# Patient Record
Sex: MALE | Race: BLACK OR AFRICAN AMERICAN | Employment: PART TIME | URBAN - METROPOLITAN AREA
[De-identification: names, ages, dates, MRNs, and addresses within clinical notes are randomized per-mention and may not be internally consistent; named-entity substitution may affect disease eponyms.]

---

## 2017-02-26 RX ORDER — TAMSULOSIN HYDROCHLORIDE 0.4 MG/1
CAPSULE ORAL
Qty: 30 CAP | Refills: 5 | Status: SHIPPED | COMMUNITY
Start: 2017-02-26 | End: 2017-09-06 | Stop reason: SDUPTHER

## 2017-05-02 ENCOUNTER — OFFICE VISIT (OUTPATIENT)
Dept: FAMILY MEDICINE CLINIC | Age: 62
End: 2017-05-02

## 2017-05-02 VITALS
HEIGHT: 71 IN | TEMPERATURE: 95.8 F | HEART RATE: 82 BPM | BODY MASS INDEX: 27.92 KG/M2 | RESPIRATION RATE: 16 BRPM | SYSTOLIC BLOOD PRESSURE: 130 MMHG | WEIGHT: 199.4 LBS | OXYGEN SATURATION: 99 % | DIASTOLIC BLOOD PRESSURE: 87 MMHG

## 2017-05-02 DIAGNOSIS — G89.29 CHRONIC BILATERAL LOW BACK PAIN, WITH SCIATICA PRESENCE UNSPECIFIED: Primary | ICD-10-CM

## 2017-05-02 DIAGNOSIS — M54.5 CHRONIC BILATERAL LOW BACK PAIN, WITH SCIATICA PRESENCE UNSPECIFIED: Primary | ICD-10-CM

## 2017-05-02 DIAGNOSIS — M19.019 SHOULDER ARTHRITIS: ICD-10-CM

## 2017-05-02 DIAGNOSIS — I10 ESSENTIAL HYPERTENSION, BENIGN: ICD-10-CM

## 2017-05-02 RX ORDER — MELOXICAM 15 MG/1
TABLET ORAL
Qty: 30 TAB | Refills: 1 | Status: SHIPPED | OUTPATIENT
Start: 2017-05-02 | End: 2017-05-31 | Stop reason: ALTCHOICE

## 2017-05-02 RX ORDER — LANOLIN ALCOHOL/MO/W.PET/CERES
400 CREAM (GRAM) TOPICAL DAILY
Qty: 30 TAB | Refills: 4 | Status: SHIPPED | OUTPATIENT
Start: 2017-05-02 | End: 2017-09-01 | Stop reason: ALTCHOICE

## 2017-05-02 RX ORDER — HYDROCHLOROTHIAZIDE 25 MG/1
TABLET ORAL
Qty: 90 TAB | Refills: 1 | Status: SHIPPED | OUTPATIENT
Start: 2017-05-02 | End: 2017-11-10 | Stop reason: SDUPTHER

## 2017-05-02 NOTE — PROGRESS NOTES
Chief Complaint   Patient presents with    Hypertension     6 month follow-up   Discuss leg and arm pain  Room 4

## 2017-05-02 NOTE — PROGRESS NOTES
Subjective:     Chief Complaint   Patient presents with    Hypertension     6 month follow-up        He  is a 63 y.o. male who presents for evaluation of:    Shoulder pain - R shoulder pain mostly better. +  Night pain. No further weakness or numbess. L shoulder pain seemed to improve. 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 Pain is much better with Mobic but still having a lot of pain at night. Heat and massage help a little bit. Did not go to PT. Steroid injection helped slightly. X rays confirm OA. Low Back Pain  Symptoms have been present for months at different times. Initial inciting event: none. Symptoms are worst: nighttime. Exacerbating factors are recumbency. Alleviating factors are moving. Treatments so far initiated by patient: none - not taking the Mobic as rec previously. 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 Tingling in RLE worse in am.  No RLS sx. Has some OA in knees. No red flag symptoms including saddle anesthesia, incontinence, weakness, numbness, or tingling.     ROS: per HPI     Objective:     Vitals:    05/02/17 1534   BP: 130/87   Pulse: 82   Resp: 16   Temp: 95.8 °F (35.4 °C)   TempSrc: Oral   SpO2: 99%   Weight: 199 lb 6.4 oz (90.4 kg)   Height: 5' 11\" (1.803 m)     Physical Examination:  General appearance - alert, well appearing, and in no distress  Eyes -sclera anicteric  Neck - supple, no significant adenopathy, no thyromegaly, no bruits  Chest - clear to auscultation, no wheezes, rales or rhonchi, symmetric air entry  Heart - normal rate, regular rhythm, normal S1, S2, no murmurs, rubs, clicks or gallops  Neurological - alert, oriented, no focal findings or movement disorder noted  Msk - Bilat shoulders with FROM, Neg Neers and Flowers, Neg impingement, Neg liftoff, Neg apley scratch test  Back - FROM, no ttp, nml strength and sensation  Extr - no edema    Past Medical History:   Diagnosis Date    FH: hypertension     Hypertension      Past Surgical History:   Procedure Laterality Date    HX ORTHOPAEDIC      ganglion cyst of left wrist    [place] COLONOSCOPY FLX DX W/COLLJ SPEC WHEN PFRMD  8/29/2011          Current Outpatient Prescriptions on File Prior to Visit   Medication Sig Dispense Refill    tamsulosin (FLOMAX) 0.4 mg capsule TAKE ONE CAPSULE BY MOUTH ONE TIME DAILY 30 Cap 5    multivitamin (ONE A DAY) tablet Take 1 Tab by mouth daily. No current facility-administered medications on file prior to visit. Assessment/ Plan:   Tianna Painter was seen today for hypertension. Diagnoses and all orders for this visit:    Chronic bilateral low back pain, with sciatica presence unspecified  -     meloxicam (MOBIC) 15 mg tablet; TAKE 1 TABLET BY MOUTH ONCE DAILY IN AM FOR 3 DAYS, THEN USE DAILY AS NEEDED    Shoulder arthritis  -     meloxicam (MOBIC) 15 mg tablet; TAKE 1 TABLET BY MOUTH ONCE DAILY IN AM FOR 3 DAYS, THEN USE DAILY AS NEEDED    Essential hypertension, benign  -     hydroCHLOROthiazide (HYDRODIURIL) 25 mg tablet; TAKE ONE TABLET BY MOUTH ONE TIME DAILY    Other orders  -     magnesium oxide (MAG-OX) 400 mg tablet; Take 1 Tab by mouth daily. I have discussed the diagnosis with the patient and the intended plan as seen in the above orders. The patient has received an after-visit summary and questions were answered concerning future plans. I have discussed medication side effects and warnings with the patient as well. The patient verbalizes understanding and agreement with the plan.     Follow-up Disposition:  Return in about 5 months (around 10/2/2017), or if symptoms worsen or fail to improve, for annual exam.

## 2017-05-02 NOTE — MR AVS SNAPSHOT
Visit Information Date & Time Provider Department Dept. Phone Encounter #  
 5/2/2017  3:20 PM Mitch Ken MD [facility] at [address] [id number] Follow-up Instructions Return in about 5 months (around 10/2/2017), or if symptoms worsen or fail to improve, for annual exam. Upcoming Health Maintenance Date Due Hepatitis C Screening 1955 INFLUENZA AGE 9 TO ADULT 8/1/2017 COLONOSCOPY 8/29/2021 DTaP/Tdap/Td series (2 - Td) 5/17/2026 Allergies as of 5/2/2017  Review Complete On: 5/2/2017 By: Mitch Ken MD  
 No Known Allergies Current Immunizations  Never Reviewed No immunizations on file. Not reviewed this visit You Were Diagnosed With   
  
 Codes Comments Chronic bilateral low back pain, with sciatica presence unspecified    -  Primary ICD-10-CM: M54.5, G89.29 ICD-9-CM: 724.2, 338.29 Shoulder arthritis     ICD-10-CM: M12.9 ICD-9-CM: 716.91 Essential hypertension, benign     ICD-10-CM: I10 
ICD-9-CM: 401.1 Vitals BP Pulse Temp Resp Height(growth percentile) Weight(growth percentile) 130/87 (BP 1 Location: Left arm, BP Patient Position: Sitting) 82 95.8 °F (35.4 °C) (Oral) 16 5' 11\" (1.803 m) 199 lb 6.4 oz (90.4 kg) SpO2 BMI Smoking Status 99% 27.81 kg/m2 Never Smoker BMI and BSA Data Body Mass Index Body Surface Area  
 27.81 kg/m 2 2.13 m 2 Preferred Pharmacy Pharmacy Name Phone [facility] [place] [address] [phone] Your Updated Medication List  
  
   
This list is accurate as of: 5/2/17  4:36 PM.  Always use your most recent med list.  
  
  
  
  
 hydroCHLOROthiazide 25 mg tablet Commonly known as:  HYDRODIURIL  
TAKE ONE TABLET BY MOUTH ONE TIME DAILY  
  
 magnesium oxide 400 mg tablet Commonly known as:  MAG-OX Take 1 Tab by mouth daily. meloxicam 15 mg tablet Commonly known as:  MOBIC  
 TAKE 1 TABLET BY MOUTH ONCE DAILY IN AM FOR 3 DAYS, THEN USE DAILY AS NEEDED  
  
 multivitamin tablet Commonly known as:  ONE A DAY Take 1 Tab by mouth daily. tamsulosin 0.4 mg capsule Commonly known as:  FLOMAX TAKE ONE CAPSULE BY MOUTH ONE TIME DAILY Prescriptions Sent to Pharmacy Refills  
 hydroCHLOROthiazide (HYDRODIURIL) 25 mg tablet 1 Sig: TAKE ONE TABLET BY MOUTH ONE TIME DAILY Class: Normal  
 Pharmacy: [facility] [id number] [place] [address] Ph #: [phone]  
 meloxicam (MOBIC) 15 mg tablet 1 Sig: TAKE 1 TABLET BY MOUTH ONCE DAILY IN AM FOR 3 DAYS, THEN USE DAILY AS NEEDED Class: Normal  
 Pharmacy: [facility] [id number] [place] [address] Ph #: [phone]  
 magnesium oxide (MAG-OX) 400 mg tablet 4 Sig: Take 1 Tab by mouth daily. Class: Normal  
 Pharmacy: [facility] [address] Ph #: [phone] Route: Oral  
  
Follow-up Instructions Return in about 5 months (around 10/2/2017), or if symptoms worsen or fail to improve, for annual exam.  
  
  
Introducing [facility] & HEALTH SERVICES! [employer] introduces [employer]Shoe patient portal. Now you can access parts of your medical record, email your doctor's office, and request medication refills online. 1. In your internet browser, go to [email or web address] 2. Click on the First Time User? Click Here link in the Sign In box. You will see the New Member Sign Up page. 3. Enter your TalkShoe Access Code exactly as it appears below. You will not need to use this code after youve completed the sign-up process. If you do not sign up before the expiration date, you must request a new code. · TalkShoe Access Code: [email or web address] Expires: 7/31/2017  4:36 PM 
 
4. Enter the last four digits of your Social Security Number (xxxx) and Date of Birth (mm/dd/yyyy) as indicated and click Submit.  You will be taken to the next sign-up page. 5. Create a madKast ID. This will be your madKast login ID and cannot be changed, so think of one that is secure and easy to remember. 6. Create a madKast password. You can change your password at any time. 7. Enter your Password Reset Question and Answer. This can be used at a later time if you forget your password. 8. Enter your e-mail address. You will receive e-mail notification when new information is available in [address] 9. Click Sign Up. You can now view and download portions of your medical record. 10. Click the Download Summary menu link to download a portable copy of your medical information. If you have questions, please visit the Frequently Asked Questions section of the madKast website. Remember, madKast is NOT to be used for urgent needs. For medical emergencies, dial 911. Now available from your iPhone and Android! Please provide this summary of care documentation to your next provider. Your primary care clinician is listed as Ligia Jackson. If you have any questions after today's visit, please call [phone].

## 2017-05-22 ENCOUNTER — OFFICE VISIT (OUTPATIENT)
Dept: FAMILY MEDICINE CLINIC | Age: 62
End: 2017-05-22

## 2017-05-22 ENCOUNTER — HOSPITAL ENCOUNTER (OUTPATIENT)
Dept: GENERAL RADIOLOGY | Age: 62
Discharge: HOME | End: 2017-05-22
Payer: COMMERCIAL

## 2017-05-22 VITALS
DIASTOLIC BLOOD PRESSURE: 72 MMHG | HEART RATE: 87 BPM | OXYGEN SATURATION: 100 % | WEIGHT: 199 LBS | RESPIRATION RATE: 18 BRPM | TEMPERATURE: 97.5 F | HEIGHT: 71 IN | SYSTOLIC BLOOD PRESSURE: 148 MMHG | BODY MASS INDEX: 27.86 KG/M2

## 2017-05-22 DIAGNOSIS — M54.32 SCIATICA OF LEFT SIDE: ICD-10-CM

## 2017-05-22 DIAGNOSIS — M48.061 LUMBAR SPINAL STENOSIS: ICD-10-CM

## 2017-05-22 DIAGNOSIS — M54.16 LUMBAR RADICULOPATHY: ICD-10-CM

## 2017-05-22 DIAGNOSIS — M54.32 SCIATICA OF LEFT SIDE: Primary | ICD-10-CM

## 2017-05-22 PROCEDURE — 72100 X-RAY EXAM L-S SPINE 2/3 VWS: CPT

## 2017-05-22 RX ORDER — HYDROCODONE BITARTRATE AND ACETAMINOPHEN 5; 325 MG/1; MG/1
1 TABLET ORAL
Qty: 40 TAB | Refills: 0 | Status: SHIPPED | OUTPATIENT
Start: 2017-05-22 | End: 2017-06-12 | Stop reason: ALTCHOICE

## 2017-05-22 RX ORDER — METHYLPREDNISOLONE ACETATE 80 MG/ML
80 INJECTION, SUSPENSION INTRA-ARTICULAR; INTRALESIONAL; INTRAMUSCULAR; SOFT TISSUE ONCE
Qty: 1 VIAL | Refills: 0
Start: 2017-05-22 | End: 2017-05-22

## 2017-05-22 RX ORDER — PREDNISONE 20 MG/1
TABLET ORAL
Qty: 18 TAB | Refills: 0 | Status: SHIPPED | OUTPATIENT
Start: 2017-05-22 | End: 2017-05-31

## 2017-05-22 NOTE — MR AVS SNAPSHOT
Visit Information Date & Time Provider Department Dept. Phone Encounter #  
 5/22/2017 10:10 AM Mitch Ken MD [facility] at [address] [id number] Follow-up Instructions Return in about 4 weeks (around 6/19/2017), or if symptoms worsen or fail to improve. Your Appointments 10/2/2017  3:00 PM  
COMPLETE PHYSICAL with Mitch Ken MD  
[facility] at [address]) Appt Note: annual exam  
 [facility] [address] [id number]  
[facility] Upcoming Health Maintenance Date Due Hepatitis C Screening 1955 INFLUENZA AGE 9 TO ADULT 8/1/2017 COLONOSCOPY 8/29/2021 DTaP/Tdap/Td series (2 - Td) 5/17/2026 Allergies as of 5/22/2017  Review Complete On: 5/22/2017 By: Baudilio Mendoza LPN No Known Allergies Current Immunizations  Never Reviewed No immunizations on file. Not reviewed this visit You Were Diagnosed With   
  
 Codes Comments Sciatica of left side    -  Primary ICD-10-CM: M54.32 
ICD-9-CM: 724.3 Lumbar spinal stenosis     ICD-10-CM: M48.06 
ICD-9-CM: 724.02 Lumbar radiculopathy     ICD-10-CM: M54.16 
ICD-9-CM: 724.4 Vitals BP Pulse Temp Resp Height(growth percentile) Weight(growth percentile) 148/72 (BP 1 Location: Left arm, BP Patient Position: Sitting) 87 97.5 °F (36.4 °C) (Oral) 18 5' 11\" (1.803 m) 199 lb (90.3 kg) SpO2 BMI Smoking Status 100% 27.75 kg/m2 Never Smoker Vitals History BMI and BSA Data Body Mass Index Body Surface Area  
 27.75 kg/m 2 2.13 m 2 Preferred Pharmacy Pharmacy Name Phone [facility] [address] [place] [address] [phone] Your Updated Medication List  
  
   
This list is accurate as of: 5/22/17 10:48 AM.  Always use your most recent med list.  
  
  
  
  
 hydroCHLOROthiazide 25 mg tablet Commonly known as:  HYDRODIURIL  
TAKE ONE TABLET BY MOUTH ONE TIME DAILY HYDROcodone-acetaminophen 5-325 mg per tablet Commonly known as:  Angel Breeze Take 1 Tab by mouth every four (4) hours as needed for Pain. Max Daily Amount: 6 Tabs.  
  
 magnesium oxide 400 mg tablet Commonly known as:  MAG-OX Take 1 Tab by mouth daily. meloxicam 15 mg tablet Commonly known as:  MOBIC  
TAKE 1 TABLET BY MOUTH ONCE DAILY IN AM FOR 3 DAYS, THEN USE DAILY AS NEEDED  
  
 methylPREDNISolone acetate 80 mg/mL injection Commonly known as:  DEPO-MEDROL 1 mL by IntraMUSCular route once for 1 dose. multivitamin tablet Commonly known as:  ONE A DAY Take 1 Tab by mouth daily. predniSONE 20 mg tablet Commonly known as:  Maris Mahajanwell Take 3 pills for 3 days, then 2 pills for 3 days, then 1 pill for 3 days  
  
 tamsulosin 0.4 mg capsule Commonly known as:  FLOMAX TAKE ONE CAPSULE BY MOUTH ONE TIME DAILY Prescriptions Printed Refills HYDROcodone-acetaminophen (NORCO) 5-325 mg per tablet 0 Sig: Take 1 Tab by mouth every four (4) hours as needed for Pain. Max Daily Amount: 6 Tabs. Class: Print Route: Oral  
  
Prescriptions Sent to Pharmacy Refills  
 predniSONE (DELTASONE) 20 mg tablet 0 Sig: Take 3 pills for 3 days, then 2 pills for 3 days, then 1 pill for 3 days Class: Normal  
 Pharmacy: [address] Ph #: [phone] We Performed the Following METHYLPREDNISOLONE ACETATE INJECTION 80 MG [ [facility]] ID THER/PROPH/DIAG INJECTION, SUBCUT/IM [id number] CPT(R)] Follow-up Instructions Return in about 4 weeks (around 6/19/2017), or if symptoms worsen or fail to improve. To-Do List   
 05/22/2017 Imaging:  XR SPINE LUMB 2 OR 3 V Introducing [facility] & HEALTH SERVICES!    
 [employer] introduces [employer] patient portal. Now you can access parts of your medical record, email your doctor's office, and request medication refills online. 1. In your internet browser, go to [email or web address] 2. Click on the First Time User? Click Here link in the Sign In box. You will see the New Member Sign Up page. 3. Enter your [employer] Access Code exactly as it appears below. You will not need to use this code after youve completed the sign-up process. If you do not sign up before the expiration date, you must request a new code. · [employer] Access Code: [email or web address] Expires: 7/31/2017  4:36 PM 
 
4. Enter the last four digits of your Social Security Number (xxxx) and Date of Birth (mm/dd/yyyy) as indicated and click Submit. You will be taken to the next sign-up page. 5. Create a [employer] ID. This will be your [employer] login ID and cannot be changed, so think of one that is secure and easy to remember. 6. Create a [employer] password. You can change your password at any time. 7. Enter your Password Reset Question and Answer. This can be used at a later time if you forget your password. 8. Enter your e-mail address. You will receive e-mail notification when new information is available in [address] 9. Click Sign Up. You can now view and download portions of your medical record. 10. Click the Download Summary menu link to download a portable copy of your medical information. If you have questions, please visit the Frequently Asked Questions section of the [employer] website. Remember, [employer] is NOT to be used for urgent needs. For medical emergencies, dial 911. Now available from your iPhone and Android! Please provide this summary of care documentation to your next provider. Your primary care clinician is listed as Ankit Parker. If you have any questions after today's visit, please call [phone].

## 2017-05-22 NOTE — PROGRESS NOTES
Subjective:     Chief Complaint   Patient presents with    Hip Pain     Right        He  is a 63 y.o. male who presents for evaluation of:    Low Back Pain  He has been having severe LLE pain with shooting pains x 5 days but this has been a chronic issue for the past year. He was close to going to ER but decided to come in to see me instead. Symptoms have been present for > 12 months with numerous flares. Initial inciting event: none. Symptoms are worst: all day. Exacerbating factors are recumbency and extending back. Alleviating factors are leaning forward and tried to move around to get comfortable. Treatments so far initiated by patient: none - not taking the Mobic as rec previously. 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 Tingling in LLE worse in am.  No RLS sx. Has some OA in knees. No red flag symptoms including saddle anesthesia, incontinence, weakness.     ROS: per HPI     Objective:     Vitals:    05/22/17 0953   BP: 148/72   Pulse: 87   Resp: 18   Temp: 97.5 °F (36.4 °C)   TempSrc: Oral   SpO2: 100%   Weight: 199 lb (90.3 kg)   Height: 5' 11\" (1.803 m)     Physical Examination:  General appearance - alert, well appearing, and in no distress  Eyes -sclera anicteric  Chest - clear to auscultation, no wheezes, rales or rhonchi, symmetric air entry  Heart - normal rate, regular rhythm, normal S1, S2, no murmurs, rubs, clicks or gallops  Back - FROM, no ttp, nml strength and sensation, nml patellar reflexes bilat, neg SLR bilat  Extr - no edema    Past Medical History:   Diagnosis Date    FH: hypertension     Hypertension      Past Surgical History:   Procedure Laterality Date    HX ORTHOPAEDIC      ganglion cyst of left wrist    [place] COLONOSCOPY FLX DX W/COLLJ SPEC WHEN PFRMD  8/29/2011          Current Outpatient Prescriptions on File Prior to Visit   Medication Sig Dispense Refill    hydroCHLOROthiazide (HYDRODIURIL) 25 mg tablet TAKE ONE TABLET BY MOUTH ONE TIME DAILY 90 Tab 1    meloxicam (MOBIC) 15 mg tablet TAKE 1 TABLET BY MOUTH ONCE DAILY IN AM FOR 3 DAYS, THEN USE DAILY AS NEEDED (Patient taking differently: USE DAILY AS NEEDED) 30 Tab 1    magnesium oxide (MAG-OX) 400 mg tablet Take 1 Tab by mouth daily. 30 Tab 4    tamsulosin (FLOMAX) 0.4 mg capsule TAKE ONE CAPSULE BY MOUTH ONE TIME DAILY 30 Cap 5    multivitamin (ONE A DAY) tablet Take 1 Tab by mouth daily. No current facility-administered medications on file prior to visit. Assessment/ Plan:   Daksha Hamilton was seen today for hip pain. Diagnoses and all orders for this visit:    Sciatica of left side  Lumbar spinal stenosis  Lumbar radiculopathy  - Hx concerning for sp stenosis vs. Sciatica. Will get x-rays, do steroid injection and pills, and cover with PRN pain meds. -     METHYLPREDNISOLONE ACETATE INJECTION 80 MG  -     [place] THER/PROPH/DIAG INJECTION, SUBCUT/IM  -     methylPREDNISolone acetate (DEPO-MEDROL) 80 mg/mL injection; 1 mL by IntraMUSCular route once for 1 dose. -     XR SPINE LUMB 2 OR 3 V; Future  -     HYDROcodone-acetaminophen (NORCO) 5-325 mg per tablet; Take 1 Tab by mouth every four (4) hours as needed for Pain. Max Daily Amount: 6 Tabs. -     predniSONE (DELTASONE) 20 mg tablet; Take 3 pills for 3 days, then 2 pills for 3 days, then 1 pill for 3 days    I have discussed the diagnosis with the patient and the intended plan as seen in the above orders. The patient has received an after-visit summary and questions were answered concerning future plans. I have discussed medication side effects and warnings with the patient as well. The patient verbalizes understanding and agreement with the plan. Follow-up Disposition:  Return in about 4 weeks (around 6/19/2017), or if symptoms worsen or fail to improve.

## 2017-05-22 NOTE — PROGRESS NOTES
Chief Complaint   Patient presents with    Hip Pain     Right   Radiating down to toes since Thursday  Room 5

## 2017-05-30 ENCOUNTER — TELEPHONE (OUTPATIENT)
Dept: FAMILY MEDICINE CLINIC | Age: 62
End: 2017-05-30

## 2017-05-30 DIAGNOSIS — G89.29 CHRONIC MIDLINE LOW BACK PAIN WITH BILATERAL SCIATICA: Primary | ICD-10-CM

## 2017-05-30 DIAGNOSIS — M54.42 CHRONIC MIDLINE LOW BACK PAIN WITH BILATERAL SCIATICA: Primary | ICD-10-CM

## 2017-05-30 DIAGNOSIS — M54.41 CHRONIC MIDLINE LOW BACK PAIN WITH BILATERAL SCIATICA: Primary | ICD-10-CM

## 2017-05-30 NOTE — LETTER
NOTIFICATION RETURN TO WORK / SCHOOL 
 
6/2/2017 3:28 PM 
 
Mr. Silvia Lindo [address] [place] [id number] To Whom It May Concern: 
 
Silvia Lindo is [facility] the [facility]. He will return to work/school on: 6/9/17 due to a medical condition. If there are questions or concerns please have the patient contact our office.  
 
 
 
Sincerely, 
 
 
Ludivina Saxena MD

## 2017-05-30 NOTE — TELEPHONE ENCOUNTER
Patient states that pain has not improved and unable to work due to job requiring him to stand for long periods of time.

## 2017-05-30 NOTE — TELEPHONE ENCOUNTER
Pt complains of continuing back/leg pain   Medication prescribed from last visit is not helping   Aleve helped a little   He has not been able to go to work   Pls call to advise       Best number to reach him is [phone]

## 2017-06-02 ENCOUNTER — TELEPHONE (OUTPATIENT)
Dept: FAMILY MEDICINE CLINIC | Age: 62
End: 2017-06-02

## 2017-06-02 ENCOUNTER — DOCUMENTATION ONLY (OUTPATIENT)
Dept: FAMILY MEDICINE CLINIC | Age: 62
End: 2017-06-02

## 2017-06-02 NOTE — TELEPHONE ENCOUNTER
Pt wife calling for Kindra García       Re: MRI - still havent heard anything about scheduling       Best number to reach her is [phone]

## 2017-06-06 ENCOUNTER — TELEPHONE (OUTPATIENT)
Dept: FAMILY MEDICINE CLINIC | Age: 62
End: 2017-06-06

## 2017-06-06 NOTE — TELEPHONE ENCOUNTER
Pls call pt wife, PSR cannot understand what she is saying   \"Maybe about his work letter\"  She said he didn't miss his apptmnt here yesterday, Ji Rachel said he didn't have to come.        Best number to reach her is [phone]

## 2017-06-08 ENCOUNTER — HOSPITAL ENCOUNTER (OUTPATIENT)
Dept: MRI IMAGING | Age: 62
Discharge: HOME | End: 2017-06-08
Attending: FAMILY MEDICINE
Payer: COMMERCIAL

## 2017-06-08 DIAGNOSIS — M54.41 CHRONIC MIDLINE LOW BACK PAIN WITH BILATERAL SCIATICA: ICD-10-CM

## 2017-06-08 DIAGNOSIS — M54.42 CHRONIC MIDLINE LOW BACK PAIN WITH BILATERAL SCIATICA: ICD-10-CM

## 2017-06-08 DIAGNOSIS — G89.29 CHRONIC MIDLINE LOW BACK PAIN WITH BILATERAL SCIATICA: ICD-10-CM

## 2017-06-08 PROCEDURE — 72148 MRI LUMBAR SPINE W/O DYE: CPT

## 2017-06-12 ENCOUNTER — OFFICE VISIT (OUTPATIENT)
Dept: FAMILY MEDICINE CLINIC | Age: 62
End: 2017-06-12

## 2017-06-12 VITALS
SYSTOLIC BLOOD PRESSURE: 145 MMHG | HEIGHT: 71 IN | OXYGEN SATURATION: 100 % | TEMPERATURE: 98.7 F | RESPIRATION RATE: 16 BRPM | BODY MASS INDEX: 27.47 KG/M2 | HEART RATE: 77 BPM | WEIGHT: 196.2 LBS | DIASTOLIC BLOOD PRESSURE: 87 MMHG

## 2017-06-12 DIAGNOSIS — M48.07 LUMBOSACRAL SPINAL STENOSIS: Primary | ICD-10-CM

## 2017-06-12 RX ORDER — IBUPROFEN 800 MG/1
800 TABLET ORAL EVERY 8 HOURS
Qty: 60 TAB | Refills: 0
Start: 2017-06-12 | End: 2019-01-30 | Stop reason: ALTCHOICE

## 2017-06-12 RX ORDER — OXYCODONE AND ACETAMINOPHEN 5; 325 MG/1; MG/1
1 TABLET ORAL
Qty: 42 TAB | Refills: 0 | Status: SHIPPED | OUTPATIENT
Start: 2017-06-12 | End: 2017-09-01 | Stop reason: ALTCHOICE

## 2017-06-12 NOTE — MR AVS SNAPSHOT
Visit Information Date & Time Provider Department Dept. Phone Encounter #  
 6/12/2017  2:00 PM Anh Nowak MD [facility] at [address] [id number] Follow-up Instructions Return in about 4 weeks (around 7/10/2017), or if symptoms worsen or fail to improve. Your Appointments 10/2/2017  3:00 PM  
COMPLETE PHYSICAL with Anh Nowak MD  
[facility] at [facility] [address]) Appt Note: annual exam  
 [facility] [address] [address]  
[facility] Upcoming Health Maintenance Date Due Hepatitis C Screening 1955 INFLUENZA AGE 9 TO ADULT 8/1/2017 COLONOSCOPY 8/29/2021 DTaP/Tdap/Td series (2 - Td) 5/17/2026 Allergies as of 6/12/2017  Review Complete On: 6/12/2017 By: Anh Nowak MD  
 No Known Allergies Current Immunizations  Never Reviewed No immunizations on file. Not reviewed this visit You Were Diagnosed With   
  
 Codes Comments Lumbosacral spinal stenosis    -  Primary ICD-10-CM: M48.07 
ICD-9-CM: 724.02 Vitals BP Pulse Temp Resp Height(growth percentile) Weight(growth percentile) 145/87 (BP 1 Location: Left arm, BP Patient Position: Sitting) 77 98.7 °F (37.1 °C) (Oral) 16 5' 11\" (1.803 m) 196 lb 3.2 oz (89 kg) SpO2 BMI Smoking Status 100% 27.36 kg/m2 Never Smoker Vitals History BMI and BSA Data Body Mass Index Body Surface Area  
 27.36 kg/m 2 2.11 m 2 Preferred Pharmacy Pharmacy Name Phone [facility] [address] [place] [address] [phone] Your Updated Medication List  
  
   
This list is accurate as of: 6/12/17  3:10 PM.  Always use your most recent med list.  
  
  
  
  
 hydroCHLOROthiazide 25 mg tablet Commonly known as:  HYDRODIURIL  
TAKE ONE TABLET BY MOUTH ONE TIME DAILY ibuprofen 800 mg tablet Commonly known as:  MOTRIN Take 1 Tab by mouth every eight (8) hours. Take with food  
  
 magnesium oxide 400 mg tablet Commonly known as:  MAG-OX Take 1 Tab by mouth daily. multivitamin tablet Commonly known as:  ONE A DAY Take 1 Tab by mouth daily. oxyCODONE-acetaminophen 5-325 mg per tablet Commonly known as:  PERCOCET Take 1 Tab by mouth every four (4) hours as needed for Pain. Max Daily Amount: 6 Tabs. tamsulosin 0.4 mg capsule Commonly known as:  FLOMAX TAKE ONE CAPSULE BY MOUTH ONE TIME DAILY Prescriptions Printed Refills  
 oxyCODONE-acetaminophen (PERCOCET) 5-325 mg per tablet 0 Sig: Take 1 Tab by mouth every four (4) hours as needed for Pain. Max Daily Amount: 6 Tabs. Class: Print Route: Oral  
  
We Performed the Following REFERRAL TO ORTHOPEDIC SURGERY [REF62 Custom] Comments:  
 Please evaluate patient for: spinal stenosis, lumbago Follow-up Instructions Return in about 4 weeks (around 7/10/2017), or if symptoms worsen or fail to improve. Referral Information Referral ID Referred By Referred To  
  
 [id number] Gabriela GAMBOA [address] Phone: [phone] Visits Status Start Date End Date 1 New Request 6/12/17 6/12/18 If your referral has a status of pending review or denied, additional information will be sent to support the outcome of this decision. Introducing [facility]! Deisi Fiore introduces [employer] patient portal. Now you can access parts of your medical record, email your doctor's office, and request medication refills online. 1. In your internet browser, go to [email or web address] 2. Click on the First Time User? Click Here link in the Sign In box. You will see the New Member Sign Up page. 3. Enter your [employer] Access Code exactly as it appears below. You will not need to use this code after youve completed the sign-up process. If you do not sign up before the expiration date, you must request a new code. · [employer] Access Code: [email or web address] Expires: 7/31/2017  4:36 PM 
 
4. Enter the last four digits of your Social Security Number (xxxx) and Date of Birth (mm/dd/yyyy) as indicated and click Submit. You will be taken to the next sign-up page. 5. Create a [employer] ID. This will be your [employer] login ID and cannot be changed, so think of one that is secure and easy to remember. 6. Create a [employer] password. You can change your password at any time. 7. Enter your Password Reset Question and Answer. This can be used at a later time if you forget your password. 8. Enter your e-mail address. You will receive e-mail notification when new information is available in [address] 9. Click Sign Up. You can now view and download portions of your medical record. 10. Click the Download Summary menu link to download a portable copy of your medical information. If you have questions, please visit the Frequently Asked Questions section of the [employer] website. Remember, [employer] is NOT to be used for urgent needs. For medical emergencies, dial 911. Now available from your iPhone and Android! Please provide this summary of care documentation to your next provider. Your primary care clinician is listed as Trent Cazares. If you have any questions after today's visit, please call [phone].

## 2017-06-12 NOTE — PROGRESS NOTES
Chief Complaint   Patient presents with    Follow-up     MRI results   Pain worse   Unable to work or stand for long periods of time  Room 4

## 2017-06-12 NOTE — PROGRESS NOTES
Subjective:     Chief Complaint   Patient presents with    Follow-up     MRI results      He  is a 63 y.o. male who presents for evaluation of:    Low Back Pain  He has been having severe LLE pain with shooting pains x 2 weeks but this has been a chronic issue for the past year. Symptoms have been present for > 12 months with numerous flares. Initial inciting event: none. Symptoms are worst: all day. Exacerbating factors are recumbency and extending back. Alleviating factors are leaning forward and tried to move around to get comfortable. Treatments so far: Mobic, Prednisone, DepoMedrol injection, heat, stretching. 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 Pain and tingling in LLE seems to be getting worse. No saddle anesthesia and no incontinence. ROS: per HPI     Objective:     Vitals:    06/12/17 1405   BP: 145/87   Pulse: 77   Resp: 16   Temp: 98.7 °F (37.1 °C)   TempSrc: Oral   SpO2: 100%   Weight: 196 lb 3.2 oz (89 kg)   Height: 5' 11\" (1.803 m)     Physical Examination:  General appearance - alert, well appearing, and in no distress  Eyes -sclera anicteric  Chest - clear to auscultation, no wheezes, rales or rhonchi, symmetric air entry  Heart - normal rate, regular rhythm, normal S1, S2, no murmurs, rubs, clicks or gallops  Back - FROM but pain with extension, no ttp, nml strength, decreased vibratory sensation in bilat LE, nml patellar reflexes bilat  Extr - no edema    Past Medical History:   Diagnosis Date    FH: hypertension     Hypertension      Past Surgical History:   Procedure Laterality Date    HX ORTHOPAEDIC      ganglion cyst of left wrist    [place] COLONOSCOPY FLX DX W/COLLJ SPEC WHEN PFRMD  8/29/2011          Current Outpatient Prescriptions on File Prior to Visit   Medication Sig Dispense Refill    hydroCHLOROthiazide (HYDRODIURIL) 25 mg tablet TAKE ONE TABLET BY MOUTH ONE TIME DAILY 90 Tab 1    magnesium oxide (MAG-OX) 400 mg tablet Take 1 Tab by mouth daily. 30 Tab 4    tamsulosin (FLOMAX) 0.4 mg capsule TAKE ONE CAPSULE BY MOUTH ONE TIME DAILY 30 Cap 5    multivitamin (ONE A DAY) tablet Take 1 Tab by mouth daily. No current facility-administered medications on file prior to visit. Assessment/ Plan:   Marlo Lund was seen today for follow-up. Diagnoses and all orders for this visit:    Lumbosacral spinal stenosis - sending to Ortho. Concern with MRI read of possibility of instability. No improvement with steroids. Will use Percocet and ibuprofen to help with pain control. Holding on PT given MRI read. -     oxyCODONE-acetaminophen (PERCOCET) 5-325 mg per tablet; Take 1 Tab by mouth every four (4) hours as needed for Pain. Max Daily Amount: 6 Tabs. -     ibuprofen (MOTRIN) 800 mg tablet; Take 1 Tab by mouth every eight (8) hours. Take with food  -     REFERRAL TO ORTHOPEDIC SURGERY    I have discussed the diagnosis with the patient and the intended plan as seen in the above orders. The patient has received an after-visit summary and questions were answered concerning future plans. I have discussed medication side effects and warnings with the patient as well. The patient verbalizes understanding and agreement with the plan. Follow-up Disposition:  Return in about 4 weeks (around 7/10/2017), or if symptoms worsen or fail to improve.

## 2017-06-12 NOTE — LETTER
NOTIFICATION RETURN TO WORK / SCHOOL 
 
6/12/2017 3:06 PM 
 
Mr. Nohemy Humphries [address] [place] [id number] To Whom It May Concern: 
 
Nohemy Humphries [facility] He will return to work/school on: 6/26/17. He will be out due to medical illness. If there are questions or concerns please have the patient contact our office.  
 
 
 
Sincerely, 
 
 
Kaycee Villeda MD

## 2017-06-13 ENCOUNTER — TELEPHONE (OUTPATIENT)
Dept: FAMILY MEDICINE CLINIC | Age: 62
End: 2017-06-13

## 2017-06-13 NOTE — TELEPHONE ENCOUNTER
Pt wife would like a call     Re: referral for [facility]   They need all [employer] doctors     This apptmnt is out of network for them       Best number to reach wife is C [phone]

## 2017-06-14 NOTE — TELEPHONE ENCOUNTER
Patient's wife advised that [address] physician and  She states she checked with office and everything was approved.

## 2017-06-16 ENCOUNTER — DOCUMENTATION ONLY (OUTPATIENT)
Dept: FAMILY MEDICINE CLINIC | Age: 62
End: 2017-06-16

## 2017-08-03 ENCOUNTER — TELEPHONE (OUTPATIENT)
Dept: FAMILY MEDICINE CLINIC | Age: 62
End: 2017-08-03

## 2017-08-03 NOTE — TELEPHONE ENCOUNTER
----- Message from Sofya Schulz sent at 8/3/2017  9:35 AM EDT -----  Regarding: Dr. Rosina Pgua  Pt request to have referral faxed to Dr. Andrade Resendiz at [phone] for an appt on 08/15/17 3:00PM.  Best contact number to reach Dr. Yocasta Reese is [phone]. Best contact number to reach pt is [phone].

## 2017-08-17 ENCOUNTER — TELEPHONE (OUTPATIENT)
Dept: FAMILY MEDICINE CLINIC | Age: 62
End: 2017-08-17

## 2017-08-17 NOTE — TELEPHONE ENCOUNTER
----- Message from [facility] sent at 8/17/2017  7:45 AM EDT -----  Regarding: Dr. Kamar Paulson ##urgent referral req##  Pt needs a referral to go to [address] [place] [address]. His appt is [email or web address].    [place] [address] [address] [place] [id number] [phone]    The website did not list a fax# for the [address] [place] [address] location.

## 2017-08-25 ENCOUNTER — TELEPHONE (OUTPATIENT)
Dept: FAMILY MEDICINE CLINIC | Age: 62
End: 2017-08-25

## 2017-08-25 NOTE — TELEPHONE ENCOUNTER
Pt wife asking for a referral for cataract surgery     Scheduled for 9/25/17  [facility]   Dr. Aleena Saucedo number to reach her is [phone]  She wants a call to know that you received this as she stated she left a message yesterday for Rudolph Cardona and there is nothing showing in [address] [place] [address]

## 2017-08-30 ENCOUNTER — TELEPHONE (OUTPATIENT)
Dept: FAMILY MEDICINE CLINIC | Age: 62
End: 2017-08-30

## 2017-08-30 NOTE — TELEPHONE ENCOUNTER
Pt needs a return to work letter per wife   He would like to return on 9-1-17       Best number to reach her is [phone]

## 2017-08-31 ENCOUNTER — OFFICE VISIT (OUTPATIENT)
Dept: FAMILY MEDICINE CLINIC | Age: 62
End: 2017-08-31

## 2017-08-31 VITALS
OXYGEN SATURATION: 99 % | SYSTOLIC BLOOD PRESSURE: 143 MMHG | WEIGHT: 194 LBS | RESPIRATION RATE: 16 BRPM | BODY MASS INDEX: 27.16 KG/M2 | TEMPERATURE: 98.5 F | DIASTOLIC BLOOD PRESSURE: 89 MMHG | HEIGHT: 71 IN | HEART RATE: 91 BPM

## 2017-08-31 DIAGNOSIS — M48.061 FORAMINAL STENOSIS OF LUMBAR REGION: Primary | ICD-10-CM

## 2017-08-31 DIAGNOSIS — M54.32 LEFT SIDED SCIATICA: ICD-10-CM

## 2017-08-31 RX ORDER — LATANOPROST 50 UG/ML
SOLUTION/ DROPS OPHTHALMIC
Refills: 6 | COMMUNITY
Start: 2017-08-16

## 2017-08-31 NOTE — PROGRESS NOTES
Chief Complaint   Patient presents with    Follow-up     Left Hip Pain   Needs note to return to work  Room 8

## 2017-08-31 NOTE — LETTER
NOTIFICATION RETURN TO WORK / SCHOOL 
 
8/31/2017 10:48 AM 
 
Mr. Emmett Trejo [address] [place] [id number] To Whom It May Concern: 
 
Emmett Trejo is currently under the care of Mirella [facility]. He will return to work/school on: 8/31/17 with no restrictions If there are questions or concerns please have the patient contact our office.  
 
 
 
Sincerely, 
 
 
Melissa Joseph MD

## 2017-08-31 NOTE — MR AVS SNAPSHOT
Visit Information Date & Time Provider Department Dept. Phone Encounter #  
 8/31/2017 10:30 AM Fara Christianson MD [facility] at [address] [id number] Follow-up Instructions Return in about 3 months (around 11/30/2017), or if symptoms worsen or fail to improve. Your Appointments 10/2/2017  3:00 PM  
COMPLETE PHYSICAL with Fara Christianson MD  
[facility] at [facility] Appt Note: annual exam  
 [facility] [address] [address]  
[facility] Upcoming Health Maintenance Date Due Hepatitis C Screening 1955 INFLUENZA AGE 9 TO ADULT 8/1/2017 COLONOSCOPY 8/29/2021 DTaP/Tdap/Td series (2 - Td) 5/17/2026 Allergies as of 8/31/2017  Review Complete On: 8/31/2017 By: Fara Christianson MD  
 No Known Allergies Current Immunizations  Never Reviewed No immunizations on file. Not reviewed this visit You Were Diagnosed With   
  
 Codes Comments Foraminal stenosis of lumbar region    -  Primary ICD-10-CM: M99.83 ICD-9-CM: 724.02 Left sided sciatica     ICD-10-CM: M54.32 
ICD-9-CM: 724.3 Vitals BP Pulse Temp Resp Height(growth percentile) Weight(growth percentile) 143/89 (BP 1 Location: Left arm, BP Patient Position: Sitting) 91 98.5 °F (36.9 °C) (Oral) 16 5' 11\" (1.803 m) 194 lb (88 kg) SpO2 BMI Smoking Status 99% 27.06 kg/m2 Never Smoker BMI and BSA Data Body Mass Index Body Surface Area  
 27.06 kg/m 2 2.1 m 2 Preferred Pharmacy Pharmacy Name Phone [facility] [address] [place] [address] [phone] Your Updated Medication List  
  
   
This list is accurate as of: 8/31/17 10:51 AM.  Always use your most recent med list.  
  
  
  
  
 hydroCHLOROthiazide 25 mg tablet Commonly known as:  HYDRODIURIL  
 TAKE ONE TABLET BY MOUTH ONE TIME DAILY  
  
 ibuprofen 800 mg tablet Commonly known as:  MOTRIN Take 1 Tab by mouth every eight (8) hours. Take with food  
  
 latanoprost 0.005 % ophthalmic solution Commonly known as:  XALATAN  
INSTIL 1 DROP INTO BOTH EYES NIGHTLY AT BEDTIME  
  
 magnesium oxide 400 mg tablet Commonly known as:  MAG-OX Take 1 Tab by mouth daily. multivitamin tablet Commonly known as:  ONE A DAY Take 1 Tab by mouth daily. oxyCODONE-acetaminophen 5-325 mg per tablet Commonly known as:  PERCOCET Take 1 Tab by mouth every four (4) hours as needed for Pain. Max Daily Amount: 6 Tabs. tamsulosin 0.4 mg capsule Commonly known as:  FLOMAX TAKE ONE CAPSULE BY MOUTH ONE TIME DAILY Follow-up Instructions Return in about 3 months (around 11/30/2017), or if symptoms worsen or fail to improve. Introducing \[facility]\"" & [facility] SERVICES! James De León introduces [employer] patient portal. Now you can access parts of your medical record, email your doctor's office, and request medication refills online. 1. In your internet browser, go to [email or web address] 2. Click on the First Time User? Click Here link in the Sign In box. You will see the New Member Sign Up page. 3. Enter your [employer] Access Code exactly as it appears below. You will not need to use this code after youve completed the sign-up process. If you do not sign up before the expiration date, you must request a new code. · [employer] Access Code: [email or web address] Expires: 11/29/2017 10:45 AM 
 
4. Enter the last four digits of your Social Security Number (xxxx) and Date of Birth (mm/dd/yyyy) as indicated and click Submit. You will be taken to the next sign-up page. 5. Create a Minervaxt ID. This will be your [employer] login ID and cannot be changed, so think of one that is secure and easy to remember. 6. Create a [employer] password. You can change your password at any time. 7. Enter your Password Reset Question and Answer. This can be used at a later time if you forget your password. 8. Enter your e-mail address. You will receive e-mail notification when new information is available in [address] 9. Click Sign Up. You can now view and download portions of your medical record. 10. Click the Download Summary menu link to download a portable copy of your medical information. If you have questions, please visit the Frequently Asked Questions section of the [employer] website. Remember, [employer] is NOT to be used for urgent needs. For medical emergencies, dial 911. Now available from your iPhone and Android! Please provide this summary of care documentation to your next provider. Your primary care clinician is listed as Selma Burgos. If you have any questions after today's visit, please call [phone].

## 2017-08-31 NOTE — PROGRESS NOTES
Subjective:     Chief Complaint   Patient presents with    Follow-up     Left Hip Pain      He  is a 63 y.o. male who presents for evaluation of:    Foraminal stenosis and left-sided sciatica  seen by Dr. Mainor Terry and ended up seeing Dr. Saritha Nielsen to be able to do the procedure more quickly. Patient has been doing very well since having MAGGI with Dr. Saritha Nielsen. Feels much better and would like to go back to work. No further concerns today but needs a letter for work. ROS: per HPI     Objective:     Vitals:    08/31/17 1042   BP: 143/89   Pulse: 91   Resp: 16   Temp: 98.5 °F (36.9 °C)   TempSrc: Oral   SpO2: 99%   Weight: 194 lb (88 kg)   Height: 5' 11\" (1.803 m)     Physical Examination:  General appearance - alert, well appearing, and in no distress  Eyes -sclera anicteric  Chest - clear to auscultation, no wheezes, rales or rhonchi, symmetric air entry  Heart - normal rate, regular rhythm, normal S1, S2, no murmurs, rubs, clicks or gallops  Extr - no edema    Past Medical History:   Diagnosis Date    FH: hypertension     Hypertension      Past Surgical History:   Procedure Laterality Date    HX ORTHOPAEDIC      ganglion cyst of left wrist    [place] COLONOSCOPY FLX DX W/COLLJ SPEC WHEN PFRMD  8/29/2011          Current Outpatient Prescriptions on File Prior to Visit   Medication Sig Dispense Refill    ibuprofen (MOTRIN) 800 mg tablet Take 1 Tab by mouth every eight (8) hours. Take with food 60 Tab 0    hydroCHLOROthiazide (HYDRODIURIL) 25 mg tablet TAKE ONE TABLET BY MOUTH ONE TIME DAILY 90 Tab 1    tamsulosin (FLOMAX) 0.4 mg capsule TAKE ONE CAPSULE BY MOUTH ONE TIME DAILY 30 Cap 5    multivitamin (ONE A DAY) tablet Take 1 Tab by mouth daily. No current facility-administered medications on file prior to visit. Assessment/ Plan:   Diagnoses and all orders for this visit:    1. Foraminal stenosis of lumbar region  2. Left sided sciatica  - Much better after MAGGI.   Gave patient a letter to return to work.    I have discussed the diagnosis with the patient and the intended plan as seen in the above orders. The patient has received an after-visit summary and questions were answered concerning future plans. I have discussed medication side effects and warnings with the patient as well. The patient verbalizes understanding and agreement with the plan. Follow-up Disposition:  Return in about 3 months (around 11/30/2017), or if symptoms worsen or fail to improve.

## 2017-09-06 RX ORDER — TAMSULOSIN HYDROCHLORIDE 0.4 MG/1
CAPSULE ORAL
Qty: 30 CAP | Refills: 11 | Status: SHIPPED | OUTPATIENT
Start: 2017-09-06 | End: 2017-11-08 | Stop reason: SDUPTHER

## 2017-10-02 ENCOUNTER — OFFICE VISIT (OUTPATIENT)
Dept: FAMILY MEDICINE CLINIC | Age: 62
End: 2017-10-02

## 2017-10-02 VITALS
BODY MASS INDEX: 26.88 KG/M2 | HEART RATE: 86 BPM | SYSTOLIC BLOOD PRESSURE: 116 MMHG | WEIGHT: 192 LBS | OXYGEN SATURATION: 99 % | TEMPERATURE: 98.1 F | RESPIRATION RATE: 18 BRPM | DIASTOLIC BLOOD PRESSURE: 75 MMHG | HEIGHT: 71 IN

## 2017-10-02 DIAGNOSIS — M48.061 FORAMINAL STENOSIS OF LUMBAR REGION: ICD-10-CM

## 2017-10-02 DIAGNOSIS — Z92.241 S/P EPIDURAL STEROID INJECTION: ICD-10-CM

## 2017-10-02 DIAGNOSIS — R73.03 PREDIABETES: ICD-10-CM

## 2017-10-02 DIAGNOSIS — M19.011 PRIMARY OSTEOARTHRITIS OF RIGHT SHOULDER: ICD-10-CM

## 2017-10-02 DIAGNOSIS — Z11.59 SCREENING FOR VIRAL DISEASE: ICD-10-CM

## 2017-10-02 DIAGNOSIS — Z00.00 WELL ADULT EXAM: Primary | ICD-10-CM

## 2017-10-02 DIAGNOSIS — R35.1 BENIGN PROSTATIC HYPERPLASIA WITH NOCTURIA: ICD-10-CM

## 2017-10-02 DIAGNOSIS — N40.1 BENIGN PROSTATIC HYPERPLASIA WITH NOCTURIA: ICD-10-CM

## 2017-10-02 DIAGNOSIS — I10 ESSENTIAL HYPERTENSION, BENIGN: ICD-10-CM

## 2017-10-02 DIAGNOSIS — H25.9 AGE-RELATED CATARACT OF BOTH EYES, UNSPECIFIED AGE-RELATED CATARACT TYPE: ICD-10-CM

## 2017-10-02 LAB
BILIRUB UR QL STRIP: NEGATIVE
GLUCOSE UR-MCNC: NEGATIVE MG/DL
KETONES P FAST UR STRIP-MCNC: NEGATIVE MG/DL
PH UR STRIP: 6.5 [PH] (ref 4.6–8)
PROT UR QL STRIP: NEGATIVE MG/DL
SP GR UR STRIP: 1.01 (ref 1–1.03)
UA UROBILINOGEN AMB POC: NORMAL (ref 0.2–1)
URINALYSIS CLARITY POC: CLEAR
URINALYSIS COLOR POC: YELLOW
URINE BLOOD POC: NORMAL
URINE LEUKOCYTES POC: NEGATIVE
URINE NITRITES POC: NEGATIVE

## 2017-10-02 RX ORDER — LIDOCAINE HYDROCHLORIDE 10 MG/ML
1 INJECTION INFILTRATION; PERINEURAL ONCE
Qty: 1 VIAL | Refills: 0
Start: 2017-10-02 | End: 2017-10-02

## 2017-10-02 RX ORDER — METHYLPREDNISOLONE ACETATE 40 MG/ML
40 INJECTION, SUSPENSION INTRA-ARTICULAR; INTRALESIONAL; INTRAMUSCULAR; SOFT TISSUE ONCE
Qty: 1 ML | Refills: 0
Start: 2017-10-02 | End: 2017-10-02

## 2017-10-02 NOTE — PROGRESS NOTES
Subjective:     Chief Complaint   Patient presents with    Complete Physical     Annual      He  is a 63 y.o. male who presents for evaluation of:  He had a colonoscopy 2011 and was nml so will go back in 10 yrs. He is up to date on vaccines. Sees dentist regularly. Recently started seeing Dr. Mervat Vargas and found to have bilat cataracts. 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 Tingling in RLE worse in am.  No RLS sx. Has some OA in knees. Avoids pork. Hyperlipidemia & HTN  Pt is doing well on current meds with no medication side effects noted  No new myalgias, no joint pains, no weakness  No TIA's, no chest pain on exertion, no dyspnea on exertion, no swelling of ankles. Exercising - Walks daily at work. Dieting - Does a good job with diet betty with salt avoidance. Smoking - No     Lab Results   Component Value Date/Time    Cholesterol, total 150 09/26/2016 02:51 PM    HDL Cholesterol 75 09/26/2016 02:51 PM    LDL, calculated 63 09/26/2016 02:51 PM    Triglyceride 62 09/26/2016 02:51 PM     Shoulder pain - R shoulder pain better with . Night pain. No further weakness or numbess. L shoulder pain seemed to improve. 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 Pain is much better with Mobic but still having a lot of pain at night. Heat and massage help a little bit. Did not go to PT. Interested in steroid injection today. X rays confirm OA.     ROS:  Constitutional: negative for fevers, chills and fatigue  Eyes: negative for visual disturbance  Ears, nose, mouth, throat, and face: negative for hearing loss and sore throat  Respiratory: negative for cough or dyspnea on exertion  Cardiovascular: negative for chest pain, dyspnea, palpitations, fatigue  Gastrointestinal: negative for nausea, vomiting, change in bowel habits, diarrhea and abdominal pain  Genitourinary: + BPH, sx mild and better with flomax, wakes up to urinate 0-1 x per night, some weak stream sx  Integument/breast: negative for rash and skin lesion(s)  Hematologic/lymphatic: negative for easy bruising and bleeding  Musculoskeletal: per HPI  Neurological: negative for headaches and dizziness  Behavioral/Psych: negative for anxiety and depression     Objective:     Vitals:    10/02/17 1508   BP: 116/75   Pulse: 86   Resp: 18   Temp: 98.1 °F (36.7 °C)   TempSrc: Oral   SpO2: 99%   Weight: 192 lb (87.1 kg)   Height: 5' 11\" (1.803 m)     Physical Examination:  General appearance - alert, well appearing, and in no distress  Eyes - sclera anicteric, bilat cataracts  Nose - normal and patent, no erythema, discharge or polyps  Mouth - mucous membranes moist, pharynx normal without lesions  Neck - supple, no significant adenopathy  Lymphatics - no palpable lymphadenopathy, no hepatosplenomegaly  Chest - clear to auscultation, no wheezes, rales or rhonchi, symmetric air entry  Heart - normal rate, regular rhythm, normal S1, S2, no murmurs, rubs, clicks or gallops  Abdomen - soft, nontender, nondistended, no masses or organomegaly   - declined today  Neurological - alert, oriented, normal speech, no focal findings or movement disorder noted  Musculoskeletal - no joint tenderness, deformity or swelling  Extremities - no edema  Skin - no rashes or suspicious lesions    Procedure:  After verbal consent was obtained, risks and benefits of the procedure were discussed with the patient and alternatives discussed. Cleansed with alcohol pads. Injected mix of 50/50 1% lidocaine 1 ml and Depomedrol 40 mg into R shoulder.  The procedure was well tolerated with no complications      Past Medical History:   Diagnosis Date    FH: hypertension     Hypertension      Past Surgical History:   Procedure Laterality Date    HX ORTHOPAEDIC      ganglion cyst of left wrist    MD COLONOSCOPY FLX DX W/COLLJ SPEC WHEN PFRMD  8/29/2011          Current Outpatient Prescriptions on File Prior to Visit   Medication Sig Dispense Refill    tamsulosin (FLOMAX) 0.4 mg capsule TAKE ONE CAPSULE BY MOUTH ONE TIME DAILY 30 Cap 11    latanoprost (XALATAN) 0.005 % ophthalmic solution INSTIL 1 DROP INTO BOTH EYES NIGHTLY AT BEDTIME  6    ibuprofen (MOTRIN) 800 mg tablet Take 1 Tab by mouth every eight (8) hours. Take with food 60 Tab 0    hydroCHLOROthiazide (HYDRODIURIL) 25 mg tablet TAKE ONE TABLET BY MOUTH ONE TIME DAILY 90 Tab 1    multivitamin (ONE A DAY) tablet Take 1 Tab by mouth daily. No current facility-administered medications on file prior to visit. Assessment/ Plan:   Diagnoses and all orders for this visit:    1. Well adult exam  -     CBC W/O DIFF  -     HEMOGLOBIN A1C WITH EAG  -     LIPID PANEL  -     METABOLIC PANEL, COMPREHENSIVE  -     TSH 3RD GENERATION  -     AMB POC URINALYSIS DIP STICK AUTO W/O MICRO    2. Essential hypertension, benign - well controlled  -     METABOLIC PANEL, COMPREHENSIVE  -     AMB POC URINALYSIS DIP STICK AUTO W/O MICRO    3. Foraminal stenosis of lumbar region  4. S/P epidural steroid injection  - Improved    5. Primary osteoarthritis of right shoulder - injected today  -     methylPREDNISolone acetate (DEPO-MEDROL) 40 mg/mL injection; 1 mL by IntraMUSCular route once for 1 dose. -     (DEPO-MEDROL 40 mg  -   quantity 1 for Reimbursement) METHYLPREDNISOLONE ACETATE 40 mg injection  -     lidocaine (XYLOCAINE) 10 mg/mL (1 %) injection; 1 mL by IntraDERMal route once for 1 dose. -     20610 - DRAIN/INJECT LARGE JOINT/BURSA    6. Prediabetes  -     HEMOGLOBIN A1C WITH EAG    7. Screening for viral disease  -     HEPATITIS C AB    8. Benign prostatic hyperplasia with nocturia - mild sx, checking PSA  -     PROSTATE SPECIFIC AG    9. Age-related cataract of both eyes, unspecified age-related cataract type - ready for surgery soon    I have discussed the diagnosis with the patient and the intended plan as seen in the above orders.   The patient has received an after-visit summary and questions were answered concerning future plans. I have discussed medication side effects and warnings with the patient as well. The patient verbalizes understanding and agreement with the plan. Follow-up Disposition:  Return in about 6 months (around 4/2/2018), or if symptoms worsen or fail to improve.

## 2017-10-02 NOTE — MR AVS SNAPSHOT
Visit Information Date & Time Provider Department Dept. Phone Encounter #  
 10/2/2017  3:00 PM Megan Strange MD [facility] at [address] [id number] Follow-up Instructions Return in about 6 months (around 4/2/2018), or if symptoms worsen or fail to improve. Upcoming Health Maintenance Date Due Hepatitis C Screening 1955 COLONOSCOPY 8/29/2021 DTaP/Tdap/Td series (2 - Td) 5/17/2026 Allergies as of 10/2/2017  Review Complete On: 10/2/2017 By: Megan Strange MD  
 No Known Allergies Current Immunizations  Never Reviewed No immunizations on file. Not reviewed this visit You Were Diagnosed With   
  
 Codes Comments Well adult exam    -  Primary ICD-10-CM: Z00.00 ICD-9-CM: V70.0 Essential hypertension, benign     ICD-10-CM: I10 
ICD-9-CM: 401.1 Foraminal stenosis of lumbar region     ICD-10-CM: M99.83 ICD-9-CM: 724.02 S/P epidural steroid injection     ICD-10-CM: T26.277 ICD-9-CM: V45.89 Primary osteoarthritis of right shoulder     ICD-10-CM: M19.011 
ICD-9-CM: 715.11 Prediabetes     ICD-10-CM: R73.03 
ICD-9-CM: 790.29 Screening for viral disease     ICD-10-CM: Z11.59 
ICD-9-CM: V73.99 Benign prostatic hyperplasia with nocturia     ICD-10-CM: N40.1, R35.1 ICD-9-CM: 600.01, 788.43 Age-related cataract of both eyes, unspecified age-related cataract type     ICD-10-CM: H25.9 ICD-9-CM: 366.10 Vitals BP Pulse Temp Resp Height(growth percentile) Weight(growth percentile) 116/75 (BP 1 Location: Left arm, BP Patient Position: Sitting) 86 98.1 °F (36.7 °C) (Oral) 18 5' 11\" (1.803 m) 192 lb (87.1 kg) SpO2 BMI Smoking Status 99% 26.78 kg/m2 Never Smoker Vitals History BMI and BSA Data Body Mass Index Body Surface Area  
 26.78 kg/m 2 2.09 m 2 Preferred Pharmacy Pharmacy Name Phone [id number] [place] [address] [phone] Your Updated Medication List  
  
   
This list is accurate as of: 10/2/17  4:33 PM.  Always use your most recent med list.  
  
  
  
  
 hydroCHLOROthiazide 25 mg tablet Commonly known as:  HYDRODIURIL  
TAKE ONE TABLET BY MOUTH ONE TIME DAILY  
  
 ibuprofen 800 mg tablet Commonly known as:  MOTRIN Take 1 Tab by mouth every eight (8) hours. Take with food  
  
 latanoprost 0.005 % ophthalmic solution Commonly known as:  XALATAN  
INSTIL 1 DROP INTO BOTH EYES NIGHTLY AT BEDTIME  
  
 multivitamin tablet Commonly known as:  ONE A DAY Take 1 Tab by mouth daily. tamsulosin 0.4 mg capsule Commonly known as:  FLOMAX TAKE ONE CAPSULE BY MOUTH ONE TIME DAILY We Performed the Following AMB POC URINALYSIS DIP STICK AUTO W/O MICRO [31115 CPT(R)] CBC W/O DIFF [13604 CPT(R)] HEMOGLOBIN A1C WITH EAG [40406 CPT(R)] HEPATITIS C AB [60268 CPT(R)] LIPID PANEL [67093 CPT(R)] METABOLIC PANEL, COMPREHENSIVE [74681 CPT(R)] PSA, DIAGNOSTIC (PROSTATE SPECIFIC AG) [id number] CPT(R)] TSH 3RD GENERATION [77097 CPT(R)] Follow-up Instructions Return in about 6 months (around 4/2/2018), or if symptoms worsen or fail to improve. Introducing [facility] & HEALTH SERVICES! [facility] introduces [employer] patient portal. Now you can access parts of your medical record, email your doctor's office, and request medication refills online. 1. In your internet browser, go to [email or web address] 2. Click on the First Time User? Click Here link in the Sign In box. You will see the New Member Sign Up page. 3. Enter your [employer] Access Code exactly as it appears below. You will not need to use this code after youve completed the sign-up process. If you do not sign up before the expiration date, you must request a new code.  
 
· [employer] Access Code: [email or web address] 
 Expires: 11/29/2017 10:45 AM 
 
4. Enter the last four digits of your Social Security Number (xxxx) and Date of Birth (mm/dd/yyyy) as indicated and click Submit. You will be taken to the next sign-up page. 5. Create a [employer]ego ID. This will be your Tego login ID and cannot be changed, so think of one that is secure and easy to remember. 6. Create a Tego password. You can change your password at any time. 7. Enter your Password Reset Question and Answer. This can be used at a later time if you forget your password. 8. Enter your e-mail address. You will receive e-mail notification when new information is available in [address] 9. Click Sign Up. You can now view and download portions of your medical record. 10. Click the Download Summary menu link to download a portable copy of your medical information. If you have questions, please visit the Frequently Asked Questions section of the Tego website. Remember, [employer] is NOT to be used for urgent needs. For medical emergencies, dial 911. Now available from your iPhone and Android! Please provide this summary of care documentation to your next provider. Your primary care clinician is listed as Noreen Colmenares. If you have any questions after today's visit, please call [phone].

## 2017-10-05 LAB
ALBUMIN SERPL-MCNC: 4.7 G/DL (ref 3.6–4.8)
ALBUMIN/GLOB SERPL: 1.7 {RATIO} (ref 1.2–2.2)
ALP SERPL-CCNC: 63 IU/L (ref 39–117)
ALT SERPL-CCNC: 11 IU/L (ref 0–44)
AST SERPL-CCNC: 15 IU/L (ref 0–40)
BILIRUB SERPL-MCNC: 0.7 MG/DL (ref 0–1.2)
BUN SERPL-MCNC: 15 MG/DL (ref 8–27)
BUN/CREAT SERPL: 15 (ref 10–24)
CALCIUM SERPL-MCNC: 10.1 MG/DL (ref 8.6–10.2)
CHLORIDE SERPL-SCNC: 98 MMOL/L (ref 96–106)
CHOLEST SERPL-MCNC: 155 MG/DL (ref 100–199)
CO2 SERPL-SCNC: 28 MMOL/L (ref 18–29)
CREAT SERPL-MCNC: 1.01 MG/DL (ref 0.76–1.27)
ERYTHROCYTE [DISTWIDTH] IN BLOOD BY AUTOMATED COUNT: 14.2 % (ref 12.3–15.4)
EST. AVERAGE GLUCOSE BLD GHB EST-MCNC: 105 MG/DL
GLOBULIN SER CALC-MCNC: 2.8 G/DL (ref 1.5–4.5)
GLUCOSE SERPL-MCNC: 90 MG/DL (ref 65–99)
HBA1C MFR BLD: 5.3 % (ref 4.8–5.6)
HCT VFR BLD AUTO: 43.6 % (ref 37.5–51)
HCV AB S/CO SERPL IA: <0.1 S/CO RATIO (ref 0–0.9)
HDLC SERPL-MCNC: 78 MG/DL
HGB BLD-MCNC: 14.6 G/DL (ref 12.6–17.7)
LDLC SERPL CALC-MCNC: 62 MG/DL (ref 0–99)
MCH RBC QN AUTO: 31.2 PG (ref 26.6–33)
MCHC RBC AUTO-ENTMCNC: 33.5 G/DL (ref 31.5–35.7)
MCV RBC AUTO: 93 FL (ref 79–97)
PLATELET # BLD AUTO: 168 X10E3/UL (ref 150–379)
POTASSIUM SERPL-SCNC: 4.3 MMOL/L (ref 3.5–5.2)
PROT SERPL-MCNC: 7.5 G/DL (ref 6–8.5)
PSA SERPL-MCNC: 2.8 NG/ML (ref 0–4)
RBC # BLD AUTO: 4.68 X10E6/UL (ref 4.14–5.8)
SODIUM SERPL-SCNC: 140 MMOL/L (ref 134–144)
TRIGL SERPL-MCNC: 73 MG/DL (ref 0–149)
TSH SERPL DL<=0.005 MIU/L-ACNC: 1.79 UIU/ML (ref 0.45–4.5)
VLDLC SERPL CALC-MCNC: 15 MG/DL (ref 5–40)
WBC # BLD AUTO: 5.3 X10E3/UL (ref 3.4–10.8)

## 2017-10-23 ENCOUNTER — TELEPHONE (OUTPATIENT)
Dept: FAMILY MEDICINE CLINIC | Age: 62
End: 2017-10-23

## 2017-10-23 NOTE — TELEPHONE ENCOUNTER
Pt having cataract surgery on 11/13/17 per wife     She states he was just in and has had everything done, only needs a form filled out     Pls call to advise     [phone]

## 2017-11-07 ENCOUNTER — OFFICE VISIT (OUTPATIENT)
Dept: FAMILY MEDICINE CLINIC | Age: 62
End: 2017-11-07

## 2017-11-07 VITALS
WEIGHT: 190 LBS | HEART RATE: 77 BPM | DIASTOLIC BLOOD PRESSURE: 82 MMHG | TEMPERATURE: 97.8 F | OXYGEN SATURATION: 99 % | BODY MASS INDEX: 26.6 KG/M2 | RESPIRATION RATE: 16 BRPM | HEIGHT: 71 IN | SYSTOLIC BLOOD PRESSURE: 120 MMHG

## 2017-11-07 DIAGNOSIS — Z01.818 PREOP EXAMINATION: Primary | ICD-10-CM

## 2017-11-07 DIAGNOSIS — H25.9 AGE-RELATED CATARACT OF BOTH EYES, UNSPECIFIED AGE-RELATED CATARACT TYPE: ICD-10-CM

## 2017-11-07 RX ORDER — PREDNISOLONE ACETATE 10 MG/ML
SUSPENSION/ DROPS OPHTHALMIC
Refills: 1 | COMMUNITY
Start: 2017-10-11 | End: 2019-01-30 | Stop reason: ALTCHOICE

## 2017-11-07 RX ORDER — MOXIFLOXACIN 5 MG/ML
SOLUTION/ DROPS OPHTHALMIC
Refills: 1 | COMMUNITY
Start: 2017-10-12 | End: 2019-01-30 | Stop reason: ALTCHOICE

## 2017-11-07 RX ORDER — KETOROLAC TROMETHAMINE 5 MG/ML
SOLUTION OPHTHALMIC
Refills: 1 | COMMUNITY
Start: 2017-10-11 | End: 2019-01-30 | Stop reason: ALTCHOICE

## 2017-11-07 NOTE — MR AVS SNAPSHOT
Visit Information Date & Time Provider Department Dept. Phone Encounter #  
 11/7/2017  3:40 PM Sofia Davis MD [address] at [address] [place] [address] [id number] Follow-up Instructions Return in about 6 months (around 5/7/2018), or if symptoms worsen or fail to improve. Your Appointments 4/10/2018  3:40 PM  
ROUTINE CARE with Sofia Davis MD  
[address] at [facility] [address] [place] [address] Road) Appt Note: 6 mos f/u  
 [address] [address]  
[facility] Upcoming Health Maintenance Date Due COLONOSCOPY 8/29/2021 DTaP/Tdap/Td series (2 - Td) 5/17/2026 Allergies as of 11/7/2017  Review Complete On: 11/7/2017 By: Jessica Cazares LPN No Known Allergies Current Immunizations  Never Reviewed No immunizations on file. Not reviewed this visit You Were Diagnosed With   
  
 Codes Comments Preop examination    -  Primary ICD-10-CM: A53.020 ICD-9-CM: V72.84 Age-related cataract of both eyes, unspecified age-related cataract type     ICD-10-CM: H25.9 ICD-9-CM: 366.10 Vitals BP Pulse Temp Resp Height(growth percentile) Weight(growth percentile) 120/82 (BP 1 Location: Right arm, BP Patient Position: Sitting) 77 97.8 °F (36.6 °C) (Oral) 16 5' 11\" (1.803 m) 190 lb (86.2 kg) SpO2 BMI Smoking Status 99% 26.5 kg/m2 Never Smoker Vitals History BMI and BSA Data Body Mass Index Body Surface Area  
 26.5 kg/m 2 2.08 m 2 Preferred Pharmacy Pharmacy [facility] [place] [address] [phone] Your Updated Medication List  
  
   
This list is accurate as of: 11/7/17  4:38 PM.  Always use your most recent med list.  
  
  
  
  
 hydroCHLOROthiazide 25 mg tablet Commonly known as:  HYDRODIURIL  
 TAKE ONE TABLET BY MOUTH ONE TIME DAILY  
  
 ibuprofen 800 mg tablet Commonly known as:  MOTRIN Take 1 Tab by mouth every eight (8) hours. Take with food  
  
 ketorolac 0.5 % ophthalmic solution Commonly known as:  Yevonne Shown INSTILL 1 DROP IN RIGHT EYE FOUR TIMES A DAY  
  
 latanoprost 0.005 % ophthalmic solution Commonly known as:  XALATAN  
INSTIL 1 DROP INTO BOTH EYES NIGHTLY AT BEDTIME  
  
 moxifloxacin 0.5 % ophthalmic solution Commonly known as:  Romelle Kaylyn INSTILL 1 DROP IN RIGHT EYE FOUR TIMES A DAY  
  
 multivitamin tablet Commonly known as:  ONE A DAY Take 1 Tab by mouth daily. prednisoLONE acetate 1 % ophthalmic suspension Commonly known as:  PRED FORTE INSTILL 1 DROP IN RIGHT EYE FOUR TIMES A DAY  
  
 tamsulosin 0.4 mg capsule Commonly known as:  FLOMAX TAKE ONE CAPSULE BY MOUTH ONE TIME DAILY Follow-up Instructions Return in about 6 months (around 5/7/2018), or if symptoms worsen or fail to improve. Introducing [facility] & HEALTH SERVICES! Vielka Zafar introduces [employer] patient portal. Now you can access parts of your medical record, email your doctor's office, and request medication refills online. 1. In your internet browser, go to [email or web address] 2. Click on the First Time User? Click Here link in the Sign In box. You will see the New Member Sign Up page. 3. Enter your [employer] Access Code exactly as it appears below. You will not need to use this code after youve completed the sign-up process. If you do not sign up before the expiration date, you must request a new code. · [employer] Access Code: [email or web address] Expires: 11/29/2017  9:45 AM 
 
4. Enter the last four digits of your Social Security Number (xxxx) and Date of Birth (mm/dd/yyyy) as indicated and click Submit. You will be taken to the next sign-up page. 5. Create a [employer] ID.  This will be your [employer] login ID and cannot be changed, so think of one that is secure and easy to remember. 6. Create a [employer] password. You can change your password at any time. 7. Enter your Password Reset Question and Answer. This can be used at a later time if you forget your password. 8. Enter your e-mail address. You will receive e-mail notification when new information is available in [address] 9. Click Sign Up. You can now view and download portions of your medical record. 10. Click the Download Summary menu link to download a portable copy of your medical information. If you have questions, please visit the Frequently Asked Questions section of the [employer] website. Remember, [employer] is NOT to be used for urgent needs. For medical emergencies, dial 911. Now available from your iPhone and Android! Please provide this summary of care documentation to your next provider. Your primary care clinician is listed as Vu Cabreras. If you have any questions after today's visit, please call [phone].

## 2017-11-07 NOTE — PROGRESS NOTES
Subjective:     Chief Complaint   Patient presents with    Pre-op Exam     Surgery scheduled Cataract 11/13/17 &12/4/17        He  is a 63 y.o. male who presents for evaluation of:  Pre-op for cataract - Dr. Jamie Liang with R on 11/13/17 and L on 12/4/17. Doing well today. No allergies to latex. Easily able to do 4 METs. ROS:  Constitutional: negative except for fevers, chills and fatigue  Eyes: negative for visual disturbance  Ears, nose, mouth, throat, and face: negative for hearing loss and sore throat  Respiratory: negative for cough or dyspnea on exertion  Cardiovascular: negative for chest pain, dyspnea, palpitations, fatigue  Gastrointestinal: negative for nausea, vomiting, change in bowel habits, diarrhea and abdominal pain  Genitourinary:negative for frequency and dysuria  Integument/breast: negative for rash and skin lesion(s)  Hematologic/lymphatic: negative for easy bruising and bleeding  Musculoskeletal: doing ok with OA in R shoulder and lumbar foraminal stenosis  Neurological: negative for headaches and dizziness  Behavioral/Psych: negative for anxiety and depression    Past Medical History:   Diagnosis Date    FH: hypertension     Hypertension      Past Surgical History:   Procedure Laterality Date    HX ORTHOPAEDIC      ganglion cyst of left wrist    NM COLONOSCOPY FLX DX W/COLLJ SPEC WHEN PFRMD  8/29/2011          Current Outpatient Prescriptions on File Prior to Visit   Medication Sig Dispense Refill    tamsulosin (FLOMAX) 0.4 mg capsule TAKE ONE CAPSULE BY MOUTH ONE TIME DAILY 30 Cap 11    latanoprost (XALATAN) 0.005 % ophthalmic solution INSTIL 1 DROP INTO BOTH EYES NIGHTLY AT BEDTIME  6    ibuprofen (MOTRIN) 800 mg tablet Take 1 Tab by mouth every eight (8) hours. Take with food 60 Tab 0    hydroCHLOROthiazide (HYDRODIURIL) 25 mg tablet TAKE ONE TABLET BY MOUTH ONE TIME DAILY 90 Tab 1    multivitamin (ONE A DAY) tablet Take 1 Tab by mouth daily.        No current facility-administered medications on file prior to visit. Objective:     Vitals:    11/07/17 1558   BP: 120/82   Pulse: 77   Resp: 16   Temp: 97.8 °F (36.6 °C)   TempSrc: Oral   SpO2: 99%   Weight: 190 lb (86.2 kg)   Height: 5' 11\" (1.803 m)     Physical Examination:  General appearance - alert, well appearing, and in no distress  Eyes - sclera anicteric, bilat cataracts  Nose - normal and patent, no erythema, discharge or polyps  Mouth - mucous membranes moist, pharynx normal without lesions  Neck - supple, no significant adenopathy  Lymphatics - no palpable lymphadenopathy, no hepatosplenomegaly  Chest - clear to auscultation, no wheezes, rales or rhonchi, symmetric air entry  Heart - normal rate, regular rhythm, normal S1, S2, no murmurs, rubs, clicks or gallops  Abdomen - soft, nontender, nondistended, no masses or organomegaly  Neurological - alert, oriented, normal speech, no focal findings or movement disorder noted  Musculoskeletal - no joint tenderness, deformity or swelling  Extremities - no edema  Skin - no rashes or suspicious lesions    Assessment/ Plan:   Diagnoses and all orders for this visit:    1. Preop examination  2. Age-related cataract of both eyes, unspecified age-related cataract type  - Cleared for surgery. Is taking Flomax and has some risk for Floppy Iris Syndrome     I have discussed the diagnosis with the patient and the intended plan as seen in the above orders. The patient has received an after-visit summary and questions were answered concerning future plans. I have discussed medication side effects and warnings with the patient as well. The patient verbalizes understanding and agreement with the plan. Follow-up Disposition:  Return in about 6 months (around 5/7/2018), or if symptoms worsen or fail to improve.

## 2017-11-07 NOTE — PROGRESS NOTES
Chief Complaint   Patient presents with   Gokul Stewart Pre-op Exam     Surgery scheduled Cataract 11/13/17 &12/4/17   Surgeon Tito Lerma MD  Room 7

## 2017-11-08 RX ORDER — TAMSULOSIN HYDROCHLORIDE 0.4 MG/1
CAPSULE ORAL
Qty: 90 CAP | Refills: 1 | Status: SHIPPED | OUTPATIENT
Start: 2017-11-08 | End: 2018-05-06 | Stop reason: SDUPTHER

## 2017-11-09 ENCOUNTER — TELEPHONE (OUTPATIENT)
Dept: FAMILY MEDICINE CLINIC | Age: 62
End: 2017-11-09

## 2017-11-09 NOTE — TELEPHONE ENCOUNTER
Nilson Long. 18. fax form over from patient last visit for surgery     Best number to reach Ab[place] @ [phone]    Fax [phone]

## 2017-11-10 DIAGNOSIS — I10 ESSENTIAL HYPERTENSION, BENIGN: ICD-10-CM

## 2017-11-10 RX ORDER — HYDROCHLOROTHIAZIDE 25 MG/1
TABLET ORAL
Qty: 90 TAB | Refills: 1 | Status: SHIPPED | OUTPATIENT
Start: 2017-11-10 | End: 2018-05-06 | Stop reason: SDUPTHER

## 2017-12-26 ENCOUNTER — TELEPHONE (OUTPATIENT)
Dept: FAMILY MEDICINE CLINIC | Age: 62
End: 2017-12-26

## 2017-12-26 NOTE — TELEPHONE ENCOUNTER
Spoke with wife. They want copy of results letter mailed in Oct sent. They can't find the original. Copy mailed to patient.

## 2017-12-26 NOTE — TELEPHONE ENCOUNTER
----- Message from Mya Saucedo sent at 12/26/2017  8:12 AM EST -----  Regarding: Dr. Michael Lewis / Telephone  Patients wife Brandi Cox calling to request a copy of her husbands most recent lab work be faxed to her office at  [phone] and her best contact number is [phone].

## 2018-05-06 DIAGNOSIS — I10 ESSENTIAL HYPERTENSION, BENIGN: ICD-10-CM

## 2018-05-07 RX ORDER — TAMSULOSIN HYDROCHLORIDE 0.4 MG/1
CAPSULE ORAL
Qty: 90 CAP | Refills: 1 | Status: SHIPPED | OUTPATIENT
Start: 2018-05-07 | End: 2018-11-08 | Stop reason: SDUPTHER

## 2018-05-07 RX ORDER — HYDROCHLOROTHIAZIDE 25 MG/1
TABLET ORAL
Qty: 90 TAB | Refills: 1 | Status: SHIPPED | OUTPATIENT
Start: 2018-05-07 | End: 2018-11-08 | Stop reason: SDUPTHER

## 2019-01-24 DIAGNOSIS — I10 ESSENTIAL HYPERTENSION, BENIGN: ICD-10-CM

## 2019-01-24 RX ORDER — HYDROCHLOROTHIAZIDE 25 MG/1
TABLET ORAL
Qty: 90 TAB | Refills: 0 | Status: SHIPPED | OUTPATIENT
Start: 2019-01-24 | End: 2019-05-07 | Stop reason: SDUPTHER

## 2019-01-30 ENCOUNTER — OFFICE VISIT (OUTPATIENT)
Dept: FAMILY MEDICINE CLINIC | Age: 64
End: 2019-01-30

## 2019-01-30 VITALS
HEIGHT: 71 IN | TEMPERATURE: 98.5 F | WEIGHT: 213.4 LBS | DIASTOLIC BLOOD PRESSURE: 86 MMHG | HEART RATE: 88 BPM | OXYGEN SATURATION: 99 % | RESPIRATION RATE: 18 BRPM | BODY MASS INDEX: 29.88 KG/M2 | SYSTOLIC BLOOD PRESSURE: 136 MMHG

## 2019-01-30 DIAGNOSIS — Z00.00 WELL ADULT EXAM: Primary | ICD-10-CM

## 2019-01-30 DIAGNOSIS — I10 ESSENTIAL HYPERTENSION, BENIGN: ICD-10-CM

## 2019-01-30 DIAGNOSIS — R29.818 SUSPECTED SLEEP APNEA: ICD-10-CM

## 2019-01-30 DIAGNOSIS — N40.1 BENIGN PROSTATIC HYPERPLASIA WITH NOCTURIA: ICD-10-CM

## 2019-01-30 DIAGNOSIS — R73.03 PREDIABETES: ICD-10-CM

## 2019-01-30 DIAGNOSIS — Z92.241 S/P EPIDURAL STEROID INJECTION: ICD-10-CM

## 2019-01-30 DIAGNOSIS — M48.061 SPINAL STENOSIS OF LUMBAR REGION WITHOUT NEUROGENIC CLAUDICATION: ICD-10-CM

## 2019-01-30 DIAGNOSIS — R35.1 BENIGN PROSTATIC HYPERPLASIA WITH NOCTURIA: ICD-10-CM

## 2019-01-30 DIAGNOSIS — M48.061 FORAMINAL STENOSIS OF LUMBAR REGION: ICD-10-CM

## 2019-01-30 DIAGNOSIS — M19.011 PRIMARY OSTEOARTHRITIS OF RIGHT SHOULDER: ICD-10-CM

## 2019-01-30 PROBLEM — H25.819 COMBINED FORMS OF AGE-RELATED CATARACT: Status: ACTIVE | Noted: 2017-10-03

## 2019-01-30 PROBLEM — M54.32 LEFT SIDED SCIATICA: Status: ACTIVE | Noted: 2017-08-09

## 2019-01-30 PROBLEM — H40.003 GLAUCOMA SUSPECT OF BOTH EYES: Status: ACTIVE | Noted: 2017-10-03

## 2019-01-30 RX ORDER — GABAPENTIN 100 MG/1
CAPSULE ORAL
COMMUNITY
Start: 2019-01-25 | End: 2019-07-30 | Stop reason: SDUPTHER

## 2019-01-30 NOTE — PROGRESS NOTES
Subjective: Chief Complaint Patient presents with  Complete Physical  
  Annual  
  
He  is a 66 y.o. male who presents for evaluation of: CPE Since last appt, has seen Dr. Percy Durant and Dr. Yessenia Vieyra for his spinal stenosis. Getting ESIs which help. He had a colonoscopy 2011 and was nml so will go back in 10 yrs. He is up to date on vaccines. Sees dentist regularly. Hyperlipidemia & HTN Pt is doing well on current meds with no medication side effects noted No new myalgias, no joint pains, no weakness No TIA's, no chest pain on exertion, no dyspnea on exertion, no swelling of ankles. Exercising - Walks daily at work. Dieting - Does a good job with diet betty with salt avoidance. Smoking - No 
  
Lab Results Component Value Date/Time Cholesterol, total 155 10/04/2017 11:37 AM  
 HDL Cholesterol 78 10/04/2017 11:37 AM  
 LDL, calculated 62 10/04/2017 11:37 AM  
 Triglyceride 73 10/04/2017 11:37 AM  
 
ROS: 
Constitutional: negative for fevers, chills and fatigue Eyes: negative for visual disturbance Ears, nose, mouth, throat, and face: negative for hearing loss and sore throat Respiratory: negative for cough or dyspnea on exertion Cardiovascular: negative for chest pain, dyspnea, palpitations, fatigue Gastrointestinal: negative for nausea, vomiting, change in bowel habits, diarrhea and abdominal pain Genitourinary: + BPH, sx mild and better with flomax, wakes up to urinate 0-1 x per night, some weak stream sx. Integument/breast: negative for rash and skin lesion(s) Hematologic/lymphatic: negative for easy bruising and bleeding Musculoskeletal: per HPI Neurological: negative for headaches and dizziness Behavioral/Psych: negative for anxiety and depression MICHELLE risk: 1. Snoring - Do you snore loudly (louder than talking or loud enough to be heard through closed doors)? Y 
2. Tired - Do you often feel tired, fatigued, or sleepy during daytime?  Emery Sweeney 
 3. Observed - Has anyone observed you stop breathing during your sleep? Y 
4. Blood pressure - Do you have or are you being treated for high blood pressure? Y 
5. BMI - BMI more than 35 kg/m2? N 
10. Age - Age over 48 yr old? Y 
7. Neck circumference - Neck circumference greater than 40 cm? Y 
8. Gender - Gender male? Tamanna Eligio High risk of MICHELLE: >5 Mod risk of MICHELLE: 3-5 Low risk of MICHELLE: <3 
  
Objective:  
 
Vitals:  
 01/30/19 1018 BP: 136/86 Pulse: 88 Resp: 18 Temp: 98.5 °F (36.9 °C) TempSrc: Oral  
SpO2: 99% Weight: 213 lb 6.4 oz (96.8 kg) Height: 5' 11\" (1.803 m) Physical Examination: 
General appearance - alert, well appearing, and in no distress Eyes - sclera anicteric Nose - normal and patent, no erythema, discharge or polyps Mouth - mucous membranes moist, pharynx normal without lesions Neck - supple, no significant adenopathy Lymphatics - no palpable lymphadenopathy, no hepatosplenomegaly Chest - clear to auscultation, no wheezes, rales or rhonchi, symmetric air entry Heart - normal rate, regular rhythm, normal S1, S2, no murmurs, rubs, clicks or gallops Abdomen - soft, nontender, nondistended, no masses or organomegaly  - declined today Neurological - alert, oriented, normal speech, no focal findings or movement disorder noted Musculoskeletal - no joint tenderness, deformity or swelling Extremities - no edema Skin - no rashes or suspicious lesions Past Medical History:  
Diagnosis Date  FH: hypertension  Hypertension Past Surgical History:  
Procedure Laterality Date  HX ORTHOPAEDIC    
 ganglion cyst of left wrist  
 [place] COLONOSCOPY FLX DX W/COLLJ SPEC WHEN PFRMD  8/29/2011 Current Outpatient Medications on File Prior to Visit Medication Sig Dispense Refill  gabapentin (NEURONTIN) 100 mg capsule TAKE ONE CAPSULE BY MOUTH GRADUALLY INCREASING TO THREE CAPSULES NIGHTLY AS NEEDED FOR MUSCLE SPASMS  hydroCHLOROthiazide (HYDRODIURIL) 25 mg tablet TAKE ONE TABLET BY MOUTH ONE TIME DAILY 90 Tab 0  
 tamsulosin (FLOMAX) 0.4 mg capsule TAKE ONE CAPSULE BY MOUTH ONE TIME DAILY 90 Cap 0  
 latanoprost (XALATAN) 0.005 % ophthalmic solution INSTIL 1 DROP INTO BOTH EYES NIGHTLY AT BEDTIME  6  
 multivitamin (ONE A DAY) tablet Take 1 Tab by mouth daily as needed. No current facility-administered medications on file prior to visit. Assessment/ Plan:  
Diagnoses and all orders for this visit: 
 
1. Well adult exam 
 
2. Essential hypertension, benign- controlled 3. Prediabetes- stable 4. Spinal stenosis of lumbar region without neurogenic claudication 5. Foraminal stenosis of lumbar region 6. S/P epidural steroid injection - working with Ortho and PM&R, better with ESIs and not interested in sgy at this point 7. Benign prostatic hyperplasia with nocturia- stable on meds 8. Primary osteoarthritis of right shoulder - stable 9. Suspected sleep apnea- pt to check in with wife about hx and may pursue testing I have discussed the diagnosis with the patient and the intended plan as seen in the above orders. The patient has received an after-visit summary and questions were answered concerning future plans. I have discussed medication side effects and warnings with the patient as well. The patient verbalizes understanding and agreement with the plan. Follow-up Disposition: 
Return in about 6 months (around 7/30/2019).

## 2019-01-30 NOTE — PROGRESS NOTES
Chief Complaint Patient presents with  Complete Physical  
  Annual  
Was seeing Dr Rola Patel 2018 due to insurance Last seen in 9/2018

## 2019-03-05 RX ORDER — TAMSULOSIN HYDROCHLORIDE 0.4 MG/1
CAPSULE ORAL
Qty: 90 CAP | Refills: 0 | Status: SHIPPED | OUTPATIENT
Start: 2019-03-05 | End: 2019-06-04 | Stop reason: SDUPTHER

## 2019-03-20 ENCOUNTER — OFFICE VISIT (OUTPATIENT)
Dept: FAMILY MEDICINE CLINIC | Age: 64
End: 2019-03-20

## 2019-03-20 VITALS
HEIGHT: 61 IN | WEIGHT: 205 LBS | TEMPERATURE: 100.5 F | RESPIRATION RATE: 18 BRPM | OXYGEN SATURATION: 97 % | DIASTOLIC BLOOD PRESSURE: 90 MMHG | HEART RATE: 92 BPM | BODY MASS INDEX: 38.71 KG/M2 | SYSTOLIC BLOOD PRESSURE: 137 MMHG

## 2019-03-20 DIAGNOSIS — J09.X2 INFLUENZA A (H5N1): Primary | ICD-10-CM

## 2019-03-20 PROBLEM — E66.01 SEVERE OBESITY (HCC): Status: ACTIVE | Noted: 2019-03-20

## 2019-03-20 LAB
FLUAV+FLUBV AG NOSE QL IA.RAPID: NEGATIVE POS/NEG
FLUAV+FLUBV AG NOSE QL IA.RAPID: POSITIVE POS/NEG
VALID INTERNAL CONTROL?: YES

## 2019-03-20 RX ORDER — PROMETHAZINE HYDROCHLORIDE AND CODEINE PHOSPHATE 6.25; 1 MG/5ML; MG/5ML
1 SOLUTION ORAL
Qty: 60 ML | Refills: 0 | Status: SHIPPED | OUTPATIENT
Start: 2019-03-20 | End: 2019-03-23

## 2019-03-20 NOTE — PROGRESS NOTES
Subjective: Chief Complaint Patient presents with  Cold Symptoms He  is a 66y.o. year old male who presents for evaluation of URI Upper Respiratory Infection Patient complains of symptoms of a URI. Symptoms include congestion, sore throat, fever and chills, myalgias, HAs. Onset of symptoms was 4 days ago, unchanged since that time. He is drinking plenty of fluids. Evaluation to date: none. Treatment to date: none. ROS: 
Constitutional: per HPI Eyes: negative for visual disturbance Ears, nose, mouth, throat, and face: per HPI Respiratory: per HPI Cardiovascular: negative for chest pain, dyspnea, palpitations Gastrointestinal: negative for nausea, vomiting, diarrhea and abdominal pain Integument/breast: negative for rash Objective:  
 
Vitals:  
 03/20/19 1405 BP: 137/90 Pulse: 92 Resp: 18 Temp: (!) 100.5 °F (38.1 °C) TempSrc: Oral  
SpO2: 97% Weight: 205 lb (93 kg) Height: 5' 1\" (1.549 m) Physical Examination:  
Gen - NAD Eyes - sclera anicteric ENT - turbinates inflamed bilat, no sinus tenderness, post pharynx erythematous with no exudate Resp - CTAB, no w/r/r  
CV - rrr, no m/r/g No Known Allergies Social History Socioeconomic History  Marital status:  Spouse name: Not on file  Number of children: Not on file  Years of education: Not on file  Highest education level: Not on file Tobacco Use  Smoking status: Never Smoker  Smokeless tobacco: Never Used Substance and Sexual Activity  Alcohol use: Yes Alcohol/week: 2.5 oz Types: 3 Cans of beer, 2 Standard drinks or equivalent per week  Drug use: No  
 Sexual activity: Yes  
  Partners: Female Family History Problem Relation Age of Onset  Hypertension Mother  Cancer Father ? ?  
 Hypertension Brother Past Surgical History:  
Procedure Laterality Date  HX ORTHOPAEDIC    
 ganglion cyst of left wrist  
  [place] COLONOSCOPY FLX DX W/COLLJ SPEC WHEN PFRMD  8/29/2011 Past Medical History:  
Diagnosis Date  FH: hypertension  Hypertension Current Outpatient Medications Medication Sig Dispense Refill  promethazine-codeine (PHENERGAN WITH CODEINE) 6.25-10 mg/5 mL syrup Take 5 mL by mouth four (4) times daily as needed for Cough for up to 3 days. Max Daily Amount: 20 mL. 60 mL 0  
 tamsulosin (FLOMAX) 0.4 mg capsule TAKE ONE CAPSULE BY MOUTH ONE TIME DAILY 90 Cap 0  
 gabapentin (NEURONTIN) 100 mg capsule TAKE ONE CAPSULE BY MOUTH GRADUALLY INCREASING TO THREE CAPSULES NIGHTLY AS NEEDED FOR MUSCLE SPASMS  hydroCHLOROthiazide (HYDRODIURIL) 25 mg tablet TAKE ONE TABLET BY MOUTH ONE TIME DAILY 90 Tab 0  
 latanoprost (XALATAN) 0.005 % ophthalmic solution INSTIL 1 DROP INTO BOTH EYES NIGHTLY AT BEDTIME  6  
 multivitamin (ONE A DAY) tablet Take 1 Tab by mouth daily as needed. Assessment/ Plan:  
Diagnoses and all orders for this visit: 
 
1. Influenza A (H5N1) 
-     AMB POC KIERSTEN INFLUENZA A/B TEST 
-     promethazine-codeine (PHENERGAN WITH CODEINE) 6.25-10 mg/5 mL syrup; Take 5 mL by mouth four (4) times daily as needed for Cough for up to 3 days. Max Daily Amount: 20 mL. I have discussed the diagnosis with the patient and the intended plan as seen in the above orders. The patient has received an after-visit summary and questions were answered concerning future plans. I have discussed medication side effects and warnings with the patient as well. The patient verbalizes understanding and agreement with the plan. Follow-up Disposition: Not on File

## 2019-03-20 NOTE — PROGRESS NOTES
Chief Complaint Patient presents with  Cold Symptoms 1. Have you been to the ER, urgent care clinic since your last visit? Hospitalized since your last visit?no 2. Have you seen or consulted any other health care providers outside of the [address] [place] [address] since your last visit? Include any pap smears or colon screening. No 
 
 
Patient having congestion, body aches, fever, headache, since Sunday.

## 2019-05-07 DIAGNOSIS — I10 ESSENTIAL HYPERTENSION, BENIGN: ICD-10-CM

## 2019-05-07 RX ORDER — HYDROCHLOROTHIAZIDE 25 MG/1
TABLET ORAL
Qty: 90 TAB | Refills: 0 | Status: SHIPPED | OUTPATIENT
Start: 2019-05-07 | End: 2019-07-30 | Stop reason: SDUPTHER

## 2019-07-30 ENCOUNTER — OFFICE VISIT (OUTPATIENT)
Dept: FAMILY MEDICINE CLINIC | Age: 64
End: 2019-07-30

## 2019-07-30 VITALS
WEIGHT: 209 LBS | DIASTOLIC BLOOD PRESSURE: 84 MMHG | SYSTOLIC BLOOD PRESSURE: 132 MMHG | HEIGHT: 61 IN | HEART RATE: 73 BPM | OXYGEN SATURATION: 98 % | BODY MASS INDEX: 39.46 KG/M2 | RESPIRATION RATE: 16 BRPM | TEMPERATURE: 97.5 F

## 2019-07-30 DIAGNOSIS — N40.1 BENIGN PROSTATIC HYPERPLASIA WITH NOCTURIA: ICD-10-CM

## 2019-07-30 DIAGNOSIS — E66.01 SEVERE OBESITY (HCC): ICD-10-CM

## 2019-07-30 DIAGNOSIS — I10 ESSENTIAL HYPERTENSION, BENIGN: Primary | ICD-10-CM

## 2019-07-30 DIAGNOSIS — R35.1 BENIGN PROSTATIC HYPERPLASIA WITH NOCTURIA: ICD-10-CM

## 2019-07-30 DIAGNOSIS — M48.061 SPINAL STENOSIS OF LUMBAR REGION WITHOUT NEUROGENIC CLAUDICATION: ICD-10-CM

## 2019-07-30 DIAGNOSIS — Z00.00 WELL ADULT EXAM: ICD-10-CM

## 2019-07-30 RX ORDER — HYDROCHLOROTHIAZIDE 25 MG/1
TABLET ORAL
Qty: 90 TAB | Refills: 3 | Status: SHIPPED | OUTPATIENT
Start: 2019-07-30 | End: 2020-09-25 | Stop reason: SDUPTHER

## 2019-07-30 RX ORDER — GABAPENTIN 100 MG/1
CAPSULE ORAL
Qty: 30 CAP | Refills: 1 | Status: SHIPPED | OUTPATIENT
Start: 2019-07-30 | End: 2019-08-29 | Stop reason: SDUPTHER

## 2019-07-30 RX ORDER — TAMSULOSIN HYDROCHLORIDE 0.4 MG/1
CAPSULE ORAL
Qty: 90 CAP | Refills: 3 | Status: SHIPPED | OUTPATIENT
Start: 2019-07-30 | End: 2020-02-24 | Stop reason: SDUPTHER

## 2019-07-30 NOTE — PROGRESS NOTES
Chief Complaint   Patient presents with    Hypertension     6 month follow-up   1. Have you been to the ER, urgent care clinic since your last visit? Hospitalized since your last visit? No    2. Have you seen or consulted any other health care providers outside of the [address] [place] [address] since your last visit? Include any pap smears or colon screening.  No   Discuss swelling legs and feet

## 2019-07-30 NOTE — PROGRESS NOTES
Subjective:     Chief Complaint   Patient presents with    Hypertension     6 month follow-up      He  is a 60 y.o. male who presents for evaluation of:   Since last appointment, he is partially retired and only working 2 days a week    Since last appt, has seen Dr. Tyna Fleischer and Dr. Emiliano Gonzalez for his spinal stenosis. Getting ESIs which help. Hyperlipidemia & HTN  Pt is doing well on current meds with no medication side effects noted  No new myalgias, no joint pains, no weakness  No TIA's, no chest pain on exertion, no dyspnea on exertion, no swelling of ankles. Exercising - Walks daily at work. Dieting - Does a good job with diet betty with salt avoidance. Smoking - No     Lab Results   Component Value Date/Time    Cholesterol, total 155 10/04/2017 11:37 AM    HDL Cholesterol 78 10/04/2017 11:37 AM    LDL, calculated 62 10/04/2017 11:37 AM    Triglyceride 73 10/04/2017 11:37 AM     ROS  Gen - no fever/chills  Resp - no dyspnea or cough  CV - no chest pain or RODGERS  Genitourinary: + BPH, sx mild and better with flomax, wakes up to urinate 1-2 x per night, some weak stream sx.   Rest per HPI     Objective:     Vitals:    07/30/19 1015   BP: 132/84   Pulse: 73   Resp: 16   Temp: 97.5 °F (36.4 °C)   TempSrc: Oral   SpO2: 98%   Weight: 209 lb (94.8 kg)   Height: 5' 1\" (1.549 m)     Physical Examination:  General appearance - alert, well appearing, and in no distress  Eyes -sclera anicteric  Neck - supple, no significant adenopathy, no thyromegaly  Chest - clear to auscultation, no wheezes, rales or rhonchi, symmetric air entry  Heart - normal rate, regular rhythm, normal S1, S2, no murmurs, rubs, clicks or gallops  Neurological - alert, oriented, normal speech, no focal findings or movement disorder noted  Extr - no edema  Psych - normal mood and affect    Past Medical History:   Diagnosis Date    FH: hypertension     Hypertension      Past Surgical History:   Procedure Laterality Date    HX ORTHOPAEDIC      ganglion cyst of left wrist    [place] COLONOSCOPY FLX DX W/COLLJ SPEC WHEN PFRMD  8/29/2011          Current Outpatient Medications on File Prior to Visit   Medication Sig Dispense Refill    latanoprost (XALATAN) 0.005 % ophthalmic solution INSTIL 1 DROP INTO BOTH EYES NIGHTLY AT BEDTIME  6    multivitamin (ONE A DAY) tablet Take 1 Tab by mouth daily as needed. No current facility-administered medications on file prior to visit. Assessment/ Plan:   Diagnoses and all orders for this visit:    1. Essential hypertension, benigncontrolled  -     hydroCHLOROthiazide (HYDRODIURIL) 25 mg tablet; TAKE ONE TABLET BY MOUTH ONE TIME DAILY  -     METABOLIC PANEL, COMPREHENSIVE  -     URINALYSIS W/ RFLX MICROSCOPIC    2. Benign prostatic hyperplasia with nocturiamild symptoms and patient would like to maintain Flomax 0.4 mg daily. Rechecking PSA and urine today  -     tamsulosin (FLOMAX) 0.4 mg capsule; TAKE 1 CAPSULE BY MOUTH ONE TIME DAILY  -     PSA, DIAGNOSTIC (PROSTATE SPECIFIC AG)  -     URINALYSIS W/ RFLX MICROSCOPIC    3. Spinal stenosis of lumbar region without neurogenic claudication patient doing okay with controlling symptoms without MAGGI at this point. Will continue on gabapentin at a low dose as needed. Encourage weight loss  -     gabapentin (NEURONTIN) 100 mg capsule; TAKE ONE CAPSULE BY MOUTH NIGHTLY AS NEEDED FOR MUSCLE SPASMS    4. Severe obesity ([address]  Discussed the patient's BMI. The BMI follow up plan is as follows:   dietary management education, guidance, and counseling  encourage exercise  monitor weight  prescribed dietary intake  -     HEMOGLOBIN A1C WITH EAG  -     LIPID PANEL  -     TSH 3RD GENERATION    5.  Well adult examchecking labs today and will plan for exam in future  -     HEMOGLOBIN A1C WITH EAG  -     LIPID PANEL  -     METABOLIC PANEL, COMPREHENSIVE  -     TSH 3RD GENERATION  -     CBC W/O DIFF  -     PSA, DIAGNOSTIC (PROSTATE SPECIFIC AG)  -     URINALYSIS W/ RFLX MICROSCOPIC    I have discussed the diagnosis with the patient and the intended plan as seen in the above orders. The patient has received an after-visit summary and questions were answered concerning future plans. I have discussed medication side effects and warnings with the patient as well. The patient verbalizes understanding and agreement with the plan. Follow-up and Dispositions    · Return in about 6 months (around 1/30/2020), or if symptoms worsen or fail to improve.

## 2019-07-31 LAB
ALBUMIN SERPL-MCNC: 4.8 G/DL (ref 3.6–4.8)
ALBUMIN/GLOB SERPL: 1.6 {RATIO} (ref 1.2–2.2)
ALP SERPL-CCNC: 66 IU/L (ref 39–117)
ALT SERPL-CCNC: 13 IU/L (ref 0–44)
APPEARANCE UR: CLEAR
AST SERPL-CCNC: 16 IU/L (ref 0–40)
BILIRUB SERPL-MCNC: 0.6 MG/DL (ref 0–1.2)
BILIRUB UR QL STRIP: NEGATIVE
BUN SERPL-MCNC: 13 MG/DL (ref 8–27)
BUN/CREAT SERPL: 14 (ref 10–24)
CALCIUM SERPL-MCNC: 10.1 MG/DL (ref 8.6–10.2)
CHLORIDE SERPL-SCNC: 100 MMOL/L (ref 96–106)
CHOLEST SERPL-MCNC: 158 MG/DL (ref 100–199)
CO2 SERPL-SCNC: 26 MMOL/L (ref 20–29)
COLOR UR: YELLOW
CREAT SERPL-MCNC: 0.91 MG/DL (ref 0.76–1.27)
ERYTHROCYTE [DISTWIDTH] IN BLOOD BY AUTOMATED COUNT: 13.6 % (ref 12.3–15.4)
EST. AVERAGE GLUCOSE BLD GHB EST-MCNC: 111 MG/DL
GLOBULIN SER CALC-MCNC: 3 G/DL (ref 1.5–4.5)
GLUCOSE SERPL-MCNC: 106 MG/DL (ref 65–99)
GLUCOSE UR QL: NEGATIVE
HBA1C MFR BLD: 5.5 % (ref 4.8–5.6)
HCT VFR BLD AUTO: 45.7 % (ref 37.5–51)
HDLC SERPL-MCNC: 66 MG/DL
HGB BLD-MCNC: 15.1 G/DL (ref 13–17.7)
HGB UR QL STRIP: NEGATIVE
KETONES UR QL STRIP: NEGATIVE
LDLC SERPL CALC-MCNC: 75 MG/DL (ref 0–99)
LEUKOCYTE ESTERASE UR QL STRIP: NEGATIVE
MCH RBC QN AUTO: 31.7 PG (ref 26.6–33)
MCHC RBC AUTO-ENTMCNC: 33 G/DL (ref 31.5–35.7)
MCV RBC AUTO: 96 FL (ref 79–97)
MICRO URNS: NORMAL
NITRITE UR QL STRIP: NEGATIVE
PH UR STRIP: 7 [PH] (ref 5–7.5)
PLATELET # BLD AUTO: 155 X10E3/UL (ref 150–450)
POTASSIUM SERPL-SCNC: 4.3 MMOL/L (ref 3.5–5.2)
PROT SERPL-MCNC: 7.8 G/DL (ref 6–8.5)
PROT UR QL STRIP: NEGATIVE
PSA SERPL-MCNC: 4.3 NG/ML (ref 0–4)
RBC # BLD AUTO: 4.77 X10E6/UL (ref 4.14–5.8)
SODIUM SERPL-SCNC: 141 MMOL/L (ref 134–144)
SP GR UR: 1.02 (ref 1–1.03)
TRIGL SERPL-MCNC: 83 MG/DL (ref 0–149)
TSH SERPL DL<=0.005 MIU/L-ACNC: 2.81 UIU/ML (ref 0.45–4.5)
UROBILINOGEN UR STRIP-MCNC: 0.2 MG/DL (ref 0.2–1)
VLDLC SERPL CALC-MCNC: 17 MG/DL (ref 5–40)
WBC # BLD AUTO: 3.7 X10E3/UL (ref 3.4–10.8)

## 2019-08-29 DIAGNOSIS — M48.061 SPINAL STENOSIS OF LUMBAR REGION WITHOUT NEUROGENIC CLAUDICATION: ICD-10-CM

## 2019-09-03 RX ORDER — GABAPENTIN 100 MG/1
CAPSULE ORAL
Qty: 90 CAP | Refills: 0 | Status: SHIPPED | OUTPATIENT
Start: 2019-09-03

## 2019-09-04 ENCOUNTER — DOCUMENTATION ONLY (OUTPATIENT)
Dept: FAMILY MEDICINE CLINIC | Age: 64
End: 2019-09-04

## 2020-02-24 ENCOUNTER — OFFICE VISIT (OUTPATIENT)
Dept: FAMILY MEDICINE CLINIC | Age: 65
End: 2020-02-24

## 2020-02-24 VITALS
OXYGEN SATURATION: 99 % | TEMPERATURE: 98 F | WEIGHT: 220 LBS | SYSTOLIC BLOOD PRESSURE: 128 MMHG | DIASTOLIC BLOOD PRESSURE: 86 MMHG | HEIGHT: 61 IN | RESPIRATION RATE: 16 BRPM | BODY MASS INDEX: 41.54 KG/M2 | HEART RATE: 73 BPM

## 2020-02-24 DIAGNOSIS — I10 ESSENTIAL HYPERTENSION, BENIGN: ICD-10-CM

## 2020-02-24 DIAGNOSIS — H93.11 TINNITUS OF RIGHT EAR: ICD-10-CM

## 2020-02-24 DIAGNOSIS — N40.1 BENIGN PROSTATIC HYPERPLASIA WITH NOCTURIA: ICD-10-CM

## 2020-02-24 DIAGNOSIS — R35.1 BENIGN PROSTATIC HYPERPLASIA WITH NOCTURIA: ICD-10-CM

## 2020-02-24 DIAGNOSIS — R73.03 PREDIABETES: ICD-10-CM

## 2020-02-24 DIAGNOSIS — Z00.00 WELL ADULT EXAM: Primary | ICD-10-CM

## 2020-02-24 RX ORDER — TAMSULOSIN HYDROCHLORIDE 0.4 MG/1
CAPSULE ORAL
Qty: 180 CAP | Refills: 3 | Status: SHIPPED | OUTPATIENT
Start: 2020-02-24 | End: 2022-04-04 | Stop reason: SDUPTHER

## 2020-02-24 NOTE — PROGRESS NOTES
Subjective:     Chief Complaint   Patient presents with    Complete Physical     Annual      He  is a 60 y.o. male who presents for evaluation of: CPE    Since last appt, has seen Dr. Verner Asp and Dr. Naldo Diaz for his spinal stenosis. Getting ESIs which help. R ear tinnitus - sx x 2 months. Some mild chronic noise exposure over the years. No dizziness or hearing loss. Mild sx. He had a colonoscopy 2011 and was nml so will go back in 10 yrs. He is up to date on vaccines. Sees dentist regularly. Hyperlipidemia & HTN  Pt is doing well on current meds with no medication side effects noted  No new myalgias, no joint pains, no weakness  No TIA's, no chest pain on exertion, no dyspnea on exertion, no swelling of ankles. Exercising - Walks daily at work. Dieting - Does a good job with diet betty with salt avoidance. Smoking - No     Lab Results   Component Value Date/Time    Cholesterol, total 158 07/30/2019 11:32 AM    HDL Cholesterol 66 07/30/2019 11:32 AM    LDL, calculated 75 07/30/2019 11:32 AM    Triglyceride 83 07/30/2019 11:32 AM     ROS:  Constitutional: negative for fevers, chills and fatigue  Eyes: negative for visual disturbance  Ears, nose, mouth, throat, and face: negative for hearing loss and sore throat  Respiratory: negative for cough or dyspnea on exertion  Cardiovascular: negative for chest pain, dyspnea, palpitations, fatigue  Gastrointestinal: negative for nausea, vomiting, change in bowel habits, diarrhea and abdominal pain  Genitourinary: + BPH, sx mild and better with flomax, wakes up to urinate 1-2 x per night, some weak stream sx and mild straining. Integument/breast: negative for rash and skin lesion(s)  Hematologic/lymphatic: negative for easy bruising and bleeding  Musculoskeletal: per HPI  Neurological: negative for headaches and dizziness  Behavioral/Psych: negative for anxiety and depression  MICHELLE risk:  1.  Snoring - Do you snore loudly (louder than talking or loud enough to be heard through closed doors)? N  2. Tired - Do you often feel tired, fatigued, or sleepy during daytime? N  3. Observed - Has anyone observed you stop breathing during your sleep? Y  4. Blood pressure - Do you have or are you being treated for high blood pressure? Y  5. BMI - BMI more than 35 kg/m2? Y  10. Age - Age over 48 yr old? Y  7. Neck circumference - Neck circumference greater than 40 cm? Y  8. Gender - Gender male?  Y  High risk of MICHELLE: >5  Mod risk of MICHELLE: 3-5  Low risk of MICHELLE: <3     Objective:     Vitals:    02/24/20 1107   BP: 128/86   Pulse: 73   Resp: 16   Temp: 98 °F (36.7 °C)   TempSrc: Oral   SpO2: 99%   Weight: 220 lb (99.8 kg)   Height: 5' 1\" (1.549 m)     Physical Examination:  General appearance - alert, well appearing, and in no distress  Eyes - sclera anicteric  Nose - normal and patent, no erythema, discharge or polyps  Mouth - mucous membranes moist, pharynx normal without lesions  Neck - supple, no significant adenopathy  Lymphatics - no palpable lymphadenopathy, no hepatosplenomegaly  Chest - clear to auscultation, no wheezes, rales or rhonchi, symmetric air entry  Heart - normal rate, regular rhythm, normal S1, S2, no murmurs, rubs, clicks or gallops  Abdomen - soft, nontender, nondistended, no masses or organomegaly   - declined today  Neurological - alert, oriented, normal speech, no focal findings or movement disorder noted  Musculoskeletal - no joint tenderness, deformity or swelling  Extremities - no edema  Skin - no rashes or suspicious lesions    Past Medical History:   Diagnosis Date    FH: hypertension     Hypertension      Past Surgical History:   Procedure Laterality Date    HX ORTHOPAEDIC      ganglion cyst of left wrist    [place] COLONOSCOPY FLX DX W/COLLJ SPEC WHEN PFRMD  8/29/2011          Current Outpatient Medications on File Prior to Visit   Medication Sig Dispense Refill    hydroCHLOROthiazide (HYDRODIURIL) 25 mg tablet TAKE ONE TABLET BY MOUTH ONE TIME DAILY 90 Tab 3    multivitamin (ONE A DAY) tablet Take 1 Tab by mouth daily as needed.  gabapentin (NEURONTIN) 100 mg capsule TAKE ONE CAPSULE BY MOUTH NIGHTLY AS NEEDED FOR MUSCLE SPASMS 90 Cap 0    [DISCONTINUED] tamsulosin (FLOMAX) 0.4 mg capsule TAKE 1 CAPSULE BY MOUTH ONE TIME DAILY 90 Cap 3    latanoprost (XALATAN) 0.005 % ophthalmic solution INSTIL 1 DROP INTO BOTH EYES NIGHTLY AT BEDTIME  6     No current facility-administered medications on file prior to visit. Assessment/ Plan:   Diagnoses and all orders for this visit:    1. Well adult exam - doing well overall  -     HEMOGLOBIN A1C WITH EAG  -     LIPID PANEL  -     METABOLIC PANEL, COMPREHENSIVE  -     TSH 3RD GENERATION  -     CBC W/O DIFF  -     URINALYSIS W/ RFLX MICROSCOPIC  -     PSA, DIAGNOSTIC (PROSTATE SPECIFIC AG)    2. Essential hypertension, benign - controlled  -     METABOLIC PANEL, COMPREHENSIVE  -     URINALYSIS W/ RFLX MICROSCOPIC    3. Prediabetes - encouraged diet and exercise  -     HEMOGLOBIN A1C WITH EAG  -     URINALYSIS W/ RFLX MICROSCOPIC    4. Tinnitus of right ear - mild sx, declines head CT or hearing testing    5. BMI 40.0-44.9, adult [facility])  Discussed the patient's BMI. The BMI follow up plan is as follows:   dietary management education, guidance, and counseling  encourage exercise  monitor weight  prescribed dietary intake    6. Benign prostatic hyperplasia with nocturia - mild sx and will incr Flomax today  -     URINALYSIS W/ RFLX MICROSCOPIC  -     PSA, DIAGNOSTIC (PROSTATE SPECIFIC AG)  -     tamsulosin (FLOMAX) 0.4 mg capsule; TAKE 2 CAPSULES BY MOUTH ONE TIME DAILY    I have discussed the diagnosis with the patient and the intended plan as seen in the above orders. The patient has received an after-visit summary and questions were answered concerning future plans. I have discussed medication side effects and warnings with the patient as well.   The patient verbalizes understanding and agreement with the plan.    Follow-up and Dispositions    · Return in about 6 months (around 8/24/2020), or if symptoms worsen or fail to improve.

## 2020-02-24 NOTE — PROGRESS NOTES
Chief Complaint   Patient presents with    Complete Physical     Annual   1. Have you been to the ER, urgent care clinic since your last visit? Hospitalized since your last visit? No    2. Have you seen or consulted any other health care providers outside of the [address] [place] [address] since your last visit? Include any pap smears or colon screening.  No   Discuss ringing in ears

## 2020-02-25 LAB
ALBUMIN SERPL-MCNC: 4.8 G/DL (ref 3.8–4.8)
ALBUMIN/GLOB SERPL: 1.5 {RATIO} (ref 1.2–2.2)
ALP SERPL-CCNC: 75 IU/L (ref 39–117)
ALT SERPL-CCNC: 18 IU/L (ref 0–44)
APPEARANCE UR: CLEAR
AST SERPL-CCNC: 17 IU/L (ref 0–40)
BILIRUB SERPL-MCNC: 0.6 MG/DL (ref 0–1.2)
BILIRUB UR QL STRIP: NEGATIVE
BUN SERPL-MCNC: 13 MG/DL (ref 8–27)
BUN/CREAT SERPL: 13 (ref 10–24)
CALCIUM SERPL-MCNC: 10.3 MG/DL (ref 8.6–10.2)
CHLORIDE SERPL-SCNC: 102 MMOL/L (ref 96–106)
CHOLEST SERPL-MCNC: 172 MG/DL (ref 100–199)
CO2 SERPL-SCNC: 27 MMOL/L (ref 20–29)
COLOR UR: YELLOW
CREAT SERPL-MCNC: 1.01 MG/DL (ref 0.76–1.27)
ERYTHROCYTE [DISTWIDTH] IN BLOOD BY AUTOMATED COUNT: 13.1 % (ref 11.6–15.4)
EST. AVERAGE GLUCOSE BLD GHB EST-MCNC: 108 MG/DL
GLOBULIN SER CALC-MCNC: 3.2 G/DL (ref 1.5–4.5)
GLUCOSE SERPL-MCNC: 97 MG/DL (ref 65–99)
GLUCOSE UR QL: NEGATIVE
HBA1C MFR BLD: 5.4 % (ref 4.8–5.6)
HCT VFR BLD AUTO: 45.2 % (ref 37.5–51)
HDLC SERPL-MCNC: 77 MG/DL
HGB BLD-MCNC: 15.6 G/DL (ref 13–17.7)
HGB UR QL STRIP: NEGATIVE
KETONES UR QL STRIP: NEGATIVE
LDLC SERPL CALC-MCNC: 73 MG/DL (ref 0–99)
LEUKOCYTE ESTERASE UR QL STRIP: NEGATIVE
MCH RBC QN AUTO: 31.5 PG (ref 26.6–33)
MCHC RBC AUTO-ENTMCNC: 34.5 G/DL (ref 31.5–35.7)
MCV RBC AUTO: 91 FL (ref 79–97)
MICRO URNS: NORMAL
NITRITE UR QL STRIP: NEGATIVE
PH UR STRIP: 7.5 [PH] (ref 5–7.5)
PLATELET # BLD AUTO: 161 X10E3/UL (ref 150–450)
POTASSIUM SERPL-SCNC: 4.5 MMOL/L (ref 3.5–5.2)
PROT SERPL-MCNC: 8 G/DL (ref 6–8.5)
PROT UR QL STRIP: NEGATIVE
PSA SERPL-MCNC: 5.7 NG/ML (ref 0–4)
RBC # BLD AUTO: 4.95 X10E6/UL (ref 4.14–5.8)
SODIUM SERPL-SCNC: 144 MMOL/L (ref 134–144)
SP GR UR: 1.02 (ref 1–1.03)
TRIGL SERPL-MCNC: 111 MG/DL (ref 0–149)
TSH SERPL DL<=0.005 MIU/L-ACNC: 5.99 UIU/ML (ref 0.45–4.5)
UROBILINOGEN UR STRIP-MCNC: 0.2 MG/DL (ref 0.2–1)
VLDLC SERPL CALC-MCNC: 22 MG/DL (ref 5–40)
WBC # BLD AUTO: 4.2 X10E3/UL (ref 3.4–10.8)

## 2020-09-16 ENCOUNTER — OFFICE VISIT (OUTPATIENT)
Dept: FAMILY MEDICINE CLINIC | Age: 65
End: 2020-09-16
Payer: COMMERCIAL

## 2020-09-16 VITALS
DIASTOLIC BLOOD PRESSURE: 89 MMHG | SYSTOLIC BLOOD PRESSURE: 136 MMHG | RESPIRATION RATE: 16 BRPM | HEART RATE: 90 BPM | WEIGHT: 223 LBS | TEMPERATURE: 95.7 F | BODY MASS INDEX: 31.22 KG/M2 | OXYGEN SATURATION: 96 % | HEIGHT: 71 IN

## 2020-09-16 DIAGNOSIS — M48.061 SPINAL STENOSIS OF LUMBAR REGION WITHOUT NEUROGENIC CLAUDICATION: ICD-10-CM

## 2020-09-16 DIAGNOSIS — M17.12 PRIMARY OSTEOARTHRITIS OF LEFT KNEE: ICD-10-CM

## 2020-09-16 DIAGNOSIS — Z79.899 ENCOUNTER FOR LONG-TERM CURRENT USE OF MEDICATION: ICD-10-CM

## 2020-09-16 DIAGNOSIS — R73.03 PREDIABETES: ICD-10-CM

## 2020-09-16 DIAGNOSIS — R79.89 ABNORMAL TSH: ICD-10-CM

## 2020-09-16 DIAGNOSIS — I10 ESSENTIAL HYPERTENSION, BENIGN: Primary | ICD-10-CM

## 2020-09-16 DIAGNOSIS — M19.011 PRIMARY OSTEOARTHRITIS OF RIGHT SHOULDER: ICD-10-CM

## 2020-09-16 DIAGNOSIS — Z92.241 S/P EPIDURAL STEROID INJECTION: ICD-10-CM

## 2020-09-16 PROCEDURE — 99214 OFFICE O/P EST MOD 30 MIN: CPT | Performed by: FAMILY MEDICINE

## 2020-09-16 RX ORDER — DICLOFENAC SODIUM 10 MG/G
2 GEL TOPICAL 4 TIMES DAILY
Qty: 100 G | Refills: 1 | Status: SHIPPED | OUTPATIENT
Start: 2020-09-16 | End: 2020-10-31

## 2020-09-16 NOTE — PROGRESS NOTES
Chief Complaint   Patient presents with    Hypertension     6 month follow-up   1. Have you been to the ER, urgent care clinic since your last visit? Hospitalized since your last visit? No    2. Have you seen or consulted any other health care providers outside of the [address] [place] [address] since your last visit? Include any pap smears or colon screening.  No   Discuss left knee pain and right shoulder pain

## 2020-09-16 NOTE — PROGRESS NOTES
Subjective:     Chief Complaint   Patient presents with    Hypertension     6 month follow-up      He  is a 63 y.o. male who presents for evaluation of:       Since last appt, no new concerns    Previously saw Dr. Andrés Henry and Dr. Julia Osorio for his spinal stenosis. Getting ESIs which help. Hyperlipidemia & HTN  Pt is doing well on current meds with no medication side effects noted  No new myalgias, no joint pains, no weakness  No TIA's, no chest pain on exertion, no dyspnea on exertion, no swelling of ankles. Exercising - Walks daily at work. Dieting - Does a good job with diet betty with salt avoidance. Smoking - No     Lab Results   Component Value Date/Time    Cholesterol, total 172 02/24/2020 12:31 PM    HDL Cholesterol 77 02/24/2020 12:31 PM    LDL, calculated 73 02/24/2020 12:31 PM    Triglyceride 111 02/24/2020 12:31 PM     ROS  Gen - no fever/chills  Resp - no dyspnea or cough  CV - no chest pain or RODGERS  MSKcontinues to struggle with right shoulder osteoarthritis and left knee osteoarthritis. Mild symptoms and not interested in medication, physical therapy, seeing Ortho.   Rest per HPI     Objective:     Vitals:    09/16/20 0938   BP: 136/89   Pulse: 90   Resp: 16   Temp: (!) 95.7 °F (35.4 °C)   TempSrc: Temporal   SpO2: 96%   Weight: 223 lb (101.2 kg)   Height: 5' 11\" (1.803 m)     Physical Examination:  General appearance - alert, well appearing, and in no distress  Eyes -sclera anicteric  Neck - supple, no significant adenopathy, no thyromegaly  Chest - clear to auscultation, no wheezes, rales or rhonchi, symmetric air entry  Heart - normal rate, regular rhythm, normal S1, S2, no murmurs, rubs, clicks or gallops  MSK bilateral shoulders with full range of motion and no tenderness palpation, left knee with crepitus and no active effusion  Neurological - alert, oriented, normal speech, no focal findings or movement disorder noted  Extr - no edema  Psych - normal mood and affect    Past Medical History:   Diagnosis Date    FH: hypertension     Hypertension      Past Surgical History:   Procedure Laterality Date    HX ORTHOPAEDIC      ganglion cyst of left wrist    [place] COLONOSCOPY FLX DX W/COLLJ SPEC WHEN PFRMD  8/29/2011          Current Outpatient Medications on File Prior to Visit   Medication Sig Dispense Refill    tamsulosin (FLOMAX) 0.4 mg capsule TAKE 2 CAPSULES BY MOUTH ONE TIME DAILY 180 Cap 3    hydroCHLOROthiazide (HYDRODIURIL) 25 mg tablet TAKE ONE TABLET BY MOUTH ONE TIME DAILY 90 Tab 3    latanoprost (XALATAN) 0.005 % ophthalmic solution INSTIL 1 DROP INTO BOTH EYES NIGHTLY AT BEDTIME  6    multivitamin (ONE A DAY) tablet Take 1 Tab by mouth daily as needed.  gabapentin (NEURONTIN) 100 mg capsule TAKE ONE CAPSULE BY MOUTH NIGHTLY AS NEEDED FOR MUSCLE SPASMS 90 Cap 0     No current facility-administered medications on file prior to visit. Assessment/ Plan:   Diagnoses and all orders for this visit:    1. Essential hypertension, benigncontrolled  -     METABOLIC PANEL, COMPREHENSIVE    2. Prediabetesstable    3. Spinal stenosis of lumbar region without neurogenic claudication  4. S/P epidural steroid injection  Doing well with current symptoms after MAGGI    5. Primary osteoarthritis of right shoulder trial of Voltaren gel, patient to call if symptoms worsen  -     diclofenac (VOLTAREN) 1 % gel; Apply 2 g to affected area four (4) times daily. 6. Primary osteoarthritis of left kneetrial Voltaren gel, patient to call if symptoms worsen  -     diclofenac (VOLTAREN) 1 % gel; Apply 2 g to affected area four (4) times daily. 7. Abnormal TSHrechecking labs, denies symptoms  -     TSH 3RD GENERATION    8. Encounter for long-term current use of medication  -     METABOLIC PANEL, COMPREHENSIVE  -     TSH 3RD GENERATION    I have discussed the diagnosis with the patient and the intended plan as seen in the above orders.   The patient has received an after-visit summary and questions were answered concerning future plans. I have discussed medication side effects and warnings with the patient as well. The patient verbalizes understanding and agreement with the plan. Follow-up and Dispositions    · Return in about 6 months (around 3/16/2021), or if symptoms worsen or fail to improve.

## 2020-09-17 LAB
ALBUMIN SERPL-MCNC: 4.6 G/DL (ref 3.8–4.8)
ALBUMIN/GLOB SERPL: 1.6 {RATIO} (ref 1.2–2.2)
ALP SERPL-CCNC: 70 IU/L (ref 39–117)
ALT SERPL-CCNC: 16 IU/L (ref 0–44)
AST SERPL-CCNC: 17 IU/L (ref 0–40)
BILIRUB SERPL-MCNC: 0.6 MG/DL (ref 0–1.2)
BUN SERPL-MCNC: 13 MG/DL (ref 8–27)
BUN/CREAT SERPL: 13 (ref 10–24)
CALCIUM SERPL-MCNC: 9.9 MG/DL (ref 8.6–10.2)
CHLORIDE SERPL-SCNC: 100 MMOL/L (ref 96–106)
CO2 SERPL-SCNC: 24 MMOL/L (ref 20–29)
CREAT SERPL-MCNC: 0.97 MG/DL (ref 0.76–1.27)
GLOBULIN SER CALC-MCNC: 2.9 G/DL (ref 1.5–4.5)
GLUCOSE SERPL-MCNC: 120 MG/DL (ref 65–99)
POTASSIUM SERPL-SCNC: 4 MMOL/L (ref 3.5–5.2)
PROT SERPL-MCNC: 7.5 G/DL (ref 6–8.5)
SODIUM SERPL-SCNC: 138 MMOL/L (ref 134–144)
TSH SERPL DL<=0.005 MIU/L-ACNC: 3.51 UIU/ML (ref 0.45–4.5)

## 2020-09-25 DIAGNOSIS — I10 ESSENTIAL HYPERTENSION, BENIGN: ICD-10-CM

## 2020-09-25 NOTE — TELEPHONE ENCOUNTER
Pt checking on refill from his last appt:     hydroCHLOROthiazide (HYDRODIURIL) 25 mg tablet     [facility] [place]   He doesn't have many left     Best number to reach him is [phone]

## 2020-09-26 RX ORDER — HYDROCHLOROTHIAZIDE 25 MG/1
TABLET ORAL
Qty: 90 TAB | Refills: 3 | Status: SHIPPED | OUTPATIENT
Start: 2020-09-26 | End: 2020-12-18 | Stop reason: SDUPTHER

## 2020-10-31 DIAGNOSIS — M17.12 PRIMARY OSTEOARTHRITIS OF LEFT KNEE: ICD-10-CM

## 2020-10-31 DIAGNOSIS — M19.011 PRIMARY OSTEOARTHRITIS OF RIGHT SHOULDER: ICD-10-CM

## 2020-10-31 RX ORDER — DICLOFENAC SODIUM 10 MG/G
GEL TOPICAL
Qty: 100 G | Refills: 1 | Status: SHIPPED | OUTPATIENT
Start: 2020-10-31 | End: 2021-01-12 | Stop reason: SDUPTHER

## 2020-12-18 DIAGNOSIS — I10 ESSENTIAL HYPERTENSION, BENIGN: ICD-10-CM

## 2020-12-18 NOTE — TELEPHONE ENCOUNTER
Patient is calling regarding rx for medication. B/PhydroCHLOROthiazide (HYDRODIURIL) 25 mg tablet  Please call @[phone].

## 2020-12-27 RX ORDER — HYDROCHLOROTHIAZIDE 25 MG/1
TABLET ORAL
Qty: 90 TAB | Refills: 3 | Status: SHIPPED | OUTPATIENT
Start: 2020-12-27 | End: 2022-04-04 | Stop reason: SDUPTHER

## 2021-01-12 DIAGNOSIS — M17.12 PRIMARY OSTEOARTHRITIS OF LEFT KNEE: ICD-10-CM

## 2021-01-12 DIAGNOSIS — M19.011 PRIMARY OSTEOARTHRITIS OF RIGHT SHOULDER: ICD-10-CM

## 2021-01-12 RX ORDER — DICLOFENAC SODIUM 10 MG/G
GEL TOPICAL
Qty: 100 G | Refills: 1 | Status: SHIPPED | OUTPATIENT
Start: 2021-01-12 | End: 2022-04-04 | Stop reason: SDUPTHER

## 2022-03-18 PROBLEM — H25.819 COMBINED FORMS OF AGE-RELATED CATARACT: Status: ACTIVE | Noted: 2017-10-03

## 2022-03-18 PROBLEM — H40.003 GLAUCOMA SUSPECT OF BOTH EYES: Status: ACTIVE | Noted: 2017-10-03

## 2022-03-19 PROBLEM — E66.01 SEVERE OBESITY (HCC): Status: ACTIVE | Noted: 2019-03-20

## 2022-03-19 PROBLEM — M54.32 LEFT SIDED SCIATICA: Status: ACTIVE | Noted: 2017-08-09

## 2022-03-19 PROBLEM — M48.061 SPINAL STENOSIS OF LUMBAR REGION WITHOUT NEUROGENIC CLAUDICATION: Status: ACTIVE | Noted: 2018-03-07

## 2022-04-04 ENCOUNTER — OFFICE VISIT (OUTPATIENT)
Dept: FAMILY MEDICINE CLINIC | Age: 67
End: 2022-04-04
Payer: MEDICARE

## 2022-04-04 VITALS
SYSTOLIC BLOOD PRESSURE: 136 MMHG | HEART RATE: 99 BPM | DIASTOLIC BLOOD PRESSURE: 87 MMHG | HEIGHT: 71 IN | BODY MASS INDEX: 30.94 KG/M2 | TEMPERATURE: 98.2 F | WEIGHT: 221 LBS | RESPIRATION RATE: 16 BRPM | OXYGEN SATURATION: 96 %

## 2022-04-04 DIAGNOSIS — M17.12 PRIMARY OSTEOARTHRITIS OF LEFT KNEE: ICD-10-CM

## 2022-04-04 DIAGNOSIS — Z13.1 SCREENING FOR DIABETES MELLITUS: ICD-10-CM

## 2022-04-04 DIAGNOSIS — R35.1 BENIGN PROSTATIC HYPERPLASIA WITH NOCTURIA: ICD-10-CM

## 2022-04-04 DIAGNOSIS — M19.011 PRIMARY OSTEOARTHRITIS OF RIGHT SHOULDER: ICD-10-CM

## 2022-04-04 DIAGNOSIS — N40.1 BENIGN PROSTATIC HYPERPLASIA WITH NOCTURIA: ICD-10-CM

## 2022-04-04 DIAGNOSIS — I10 ESSENTIAL HYPERTENSION, BENIGN: ICD-10-CM

## 2022-04-04 DIAGNOSIS — E66.01 SEVERE OBESITY (HCC): ICD-10-CM

## 2022-04-04 DIAGNOSIS — Z13.6 SCREENING FOR ISCHEMIC HEART DISEASE: ICD-10-CM

## 2022-04-04 DIAGNOSIS — R73.03 PREDIABETES: ICD-10-CM

## 2022-04-04 DIAGNOSIS — Z79.899 ENCOUNTER FOR LONG-TERM CURRENT USE OF MEDICATION: ICD-10-CM

## 2022-04-04 DIAGNOSIS — Z00.00 WELCOME TO MEDICARE PREVENTIVE VISIT: Primary | ICD-10-CM

## 2022-04-04 PROCEDURE — G8510 SCR DEP NEG, NO PLAN REQD: HCPCS | Performed by: FAMILY MEDICINE

## 2022-04-04 PROCEDURE — G8536 NO DOC ELDER MAL SCRN: HCPCS | Performed by: FAMILY MEDICINE

## 2022-04-04 PROCEDURE — G8417 CALC BMI ABV UP PARAM F/U: HCPCS | Performed by: FAMILY MEDICINE

## 2022-04-04 PROCEDURE — G0438 PPPS, INITIAL VISIT: HCPCS | Performed by: FAMILY MEDICINE

## 2022-04-04 PROCEDURE — G8427 DOCREV CUR MEDS BY ELIG CLIN: HCPCS | Performed by: FAMILY MEDICINE

## 2022-04-04 RX ORDER — DICLOFENAC SODIUM 10 MG/G
GEL TOPICAL
Qty: 100 G | Refills: 1 | Status: SHIPPED | OUTPATIENT
Start: 2022-04-04

## 2022-04-04 RX ORDER — HYDROCHLOROTHIAZIDE 25 MG/1
TABLET ORAL
Qty: 90 TABLET | Refills: 3 | Status: SHIPPED | OUTPATIENT
Start: 2022-04-04 | End: 2022-04-08 | Stop reason: SDUPTHER

## 2022-04-04 RX ORDER — TAMSULOSIN HYDROCHLORIDE 0.4 MG/1
CAPSULE ORAL
Qty: 180 CAPSULE | Refills: 3 | Status: SHIPPED | OUTPATIENT
Start: 2022-04-04 | End: 2022-04-08 | Stop reason: SDUPTHER

## 2022-04-04 NOTE — PROGRESS NOTES
This is a \"Welcome to [employer]"  Initial Preventive Physical Examination (IPPE) providing Personalized Prevention Plan Services (Performed in the first 12 months of enrollment)    I have reviewed the patient's medical history in detail and updated the computerized patient record. Assessment/Plan - doing well, checking labs, BPH stable, screening EKG at next appt. Education and counseling provided:  Are appropriate based on today's review and evaluation    1. Welcome to Medicare preventive visit  -     HEMOGLOBIN A1C WITH EAG; Future  -     LIPID PANEL; Future  -     METABOLIC PANEL, COMPREHENSIVE; Future  -     TSH 3RD GENERATION; Future  -     CBC W/O DIFF; Future  -     URINALYSIS W/ RFLX MICROSCOPIC; Future  -     PSA, DIAGNOSTIC (PROSTATE SPECIFIC AG); Future  2. Screening for diabetes mellitus  -     HEMOGLOBIN A1C WITH EAG; Future  3. Screening for ischemic heart disease  -     LIPID PANEL; Future  4. Prediabetes  -     HEMOGLOBIN A1C WITH EAG; Future  5. Benign prostatic hyperplasia with nocturia  -     tamsulosin (FLOMAX) 0.4 mg capsule; TAKE 2 CAPSULES BY MOUTH ONE TIME DAILY, Normal, Disp-180 Capsule, R-3  -     URINALYSIS W/ RFLX MICROSCOPIC; Future  -     PSA, DIAGNOSTIC (PROSTATE SPECIFIC AG); Future  6. Primary osteoarthritis of right shoulder  -     diclofenac (VOLTAREN) 1 % gel; APPLY 2 GRAMS TO AFFECTED AREA FOUR (4) TIMES DAILY., Normal, Disp-100 g, R-1  7. Primary osteoarthritis of left knee  -     diclofenac (VOLTAREN) 1 % gel; APPLY 2 GRAMS TO AFFECTED AREA FOUR (4) TIMES DAILY., Normal, Disp-100 g, R-1  8. Essential hypertension, benign  -     hydroCHLOROthiazide (HYDRODIURIL) 25 mg tablet; TAKE ONE TABLET BY MOUTH ONE TIME DAILY, Normal, Disp-90 Tablet, R-3  -     METABOLIC PANEL, COMPREHENSIVE; Future  -     URINALYSIS W/ RFLX MICROSCOPIC; Future  9. Encounter for long-term current use of medication  -     HEMOGLOBIN A1C WITH EAG; Future  -     LIPID PANEL;  Future  -     METABOLIC PANEL, COMPREHENSIVE; Future  -     TSH 3RD GENERATION; Future  -     CBC W/O DIFF; Future  -     URINALYSIS W/ RFLX MICROSCOPIC; Future  -     PSA, DIAGNOSTIC (PROSTATE SPECIFIC AG); Future  10. Severe obesity ([address]  -     HEMOGLOBIN A1C WITH EAG; Future  -     LIPID PANEL; Future  -     TSH 3RD GENERATION; Future       Depression Risk Screen     3 most recent PHQ Screens 4/4/2022   Little interest or pleasure in doing things Not at all   Feeling down, depressed, irritable, or hopeless Not at all   Total Score PHQ 2 0       Alcohol & Drug Abuse Risk Screen    Do you average more than 1 drink per night or more than 7 drinks a week: No    In the past three months have you have had more than 4 drinks containing alcohol on one occasion: No          Functional Ability and Level of Safety    Diet: No special diet      Hearing: Hearing is good. Vision Screening:  Vision is good. No exam data present      Activities of Daily Living: The home contains: no safety equipment. Patient does total self care      Ambulation: with no difficulty      Exercise level: moderately active     Fall Risk Screen:  Fall Risk Assessment, last 12 mths 4/4/2022   Able to walk? Yes   Fall in past 12 months? 0   Do you feel unsteady? 0   Are you worried about falling 0      Abuse Screen:  Patient is not abused       Screening EKG   EKG order placed: No    End of Life Planning   Advanced care planning directives were discussed with the patient and /or family/caregiver.      Health Maintenance Due     Health Maintenance Due   Topic Date Due    COVID-19 Vaccine (1) Never done    Shingrix Vaccine Age 50> (1 of 2) Never done    Colorectal Cancer Screening Combo  08/29/2021    Pneumococcal 65+ years (1 of 1 - PPSV23) Never done       Patient Care Team   Patient Care Team:  Josette Duarte MD as PCP - General (Family Medicine)  Josette Duarte MD as PCP - [facility] Empaneled Provider  Armen Ortiz MD (Ophthalmology)  Jose Briscoe MD MELITA (Orthopedic Surgery)    History     Past Medical History:   Diagnosis Date    FH: hypertension     Hypertension       Past Surgical History:   Procedure Laterality Date    HX ORTHOPAEDIC      ganglion cyst of left wrist    [place] COLONOSCOPY FLX DX W/COLLJ SPEC WHEN PFRMD  8/29/2011          Current Outpatient Medications   Medication Sig Dispense Refill    diclofenac (VOLTAREN) 1 % gel APPLY 2 GRAMS TO AFFECTED AREA FOUR (4) TIMES DAILY. 100 g 1    hydroCHLOROthiazide (HYDRODIURIL) 25 mg tablet TAKE ONE TABLET BY MOUTH ONE TIME DAILY 90 Tablet 3    tamsulosin (FLOMAX) 0.4 mg capsule TAKE 2 CAPSULES BY MOUTH ONE TIME DAILY 180 Capsule 3    latanoprost (XALATAN) 0.005 % ophthalmic solution INSTIL 1 DROP INTO BOTH EYES NIGHTLY AT BEDTIME  6    multivitamin (ONE A DAY) tablet Take 1 Tab by mouth daily as needed.  gabapentin (NEURONTIN) 100 mg capsule TAKE ONE CAPSULE BY MOUTH NIGHTLY AS NEEDED FOR MUSCLE SPASMS (Patient not taking: Reported on 4/4/2022) 90 Cap 0     No Known Allergies    Family History   Problem Relation Age of Onset    Hypertension Mother     Cancer Father         ? ?    Hypertension Brother      Social History     Tobacco Use    Smoking status: Never Smoker    Smokeless tobacco: Never Used   Substance Use Topics    Alcohol use:  Yes     Alcohol/week: 4.2 standard drinks     Types: 3 Cans of beer, 2 Standard drinks or equivalent per week       Leonor Rodgers MD

## 2022-04-04 NOTE — PROGRESS NOTES
Chief Complaint   Patient presents with    Welcome To Medicare     Started 3/3/22   1. Have you been to the ER, urgent care clinic since your last visit? Hospitalized since your last visit? No    2. Have you seen or consulted any other health care providers outside of the [address] [place] [address] since your last visit? Include any pap smears or colon screening.  Yes Where: Dr Ruth Ann Lange

## 2022-04-06 LAB
ALBUMIN SERPL-MCNC: 5 G/DL (ref 3.8–4.8)
ALBUMIN/GLOB SERPL: 1.7 {RATIO} (ref 1.2–2.2)
ALP SERPL-CCNC: 77 IU/L (ref 44–121)
ALT SERPL-CCNC: 19 IU/L (ref 0–44)
APPEARANCE UR: CLEAR
AST SERPL-CCNC: 20 IU/L (ref 0–40)
BACTERIA #/AREA URNS HPF: ABNORMAL /[HPF]
BILIRUB SERPL-MCNC: 0.5 MG/DL (ref 0–1.2)
BILIRUB UR QL STRIP: NEGATIVE
BUN SERPL-MCNC: 10 MG/DL (ref 8–27)
BUN/CREAT SERPL: 11 (ref 10–24)
CALCIUM SERPL-MCNC: 10.3 MG/DL (ref 8.6–10.2)
CASTS URNS QL MICRO: ABNORMAL /LPF
CHLORIDE SERPL-SCNC: 96 MMOL/L (ref 96–106)
CHOLEST SERPL-MCNC: 162 MG/DL (ref 100–199)
CO2 SERPL-SCNC: 24 MMOL/L (ref 20–29)
COLOR UR: YELLOW
CREAT SERPL-MCNC: 0.94 MG/DL (ref 0.76–1.27)
EGFR: 90 ML/MIN/1.73
EPI CELLS #/AREA URNS HPF: ABNORMAL /HPF (ref 0–10)
ERYTHROCYTE [DISTWIDTH] IN BLOOD BY AUTOMATED COUNT: 13.6 % (ref 11.6–15.4)
EST. AVERAGE GLUCOSE BLD GHB EST-MCNC: 117 MG/DL
GLOBULIN SER CALC-MCNC: 2.9 G/DL (ref 1.5–4.5)
GLUCOSE SERPL-MCNC: 113 MG/DL (ref 65–99)
GLUCOSE UR QL STRIP: NEGATIVE
HBA1C MFR BLD: 5.7 % (ref 4.8–5.6)
HCT VFR BLD AUTO: 46 % (ref 37.5–51)
HDLC SERPL-MCNC: 70 MG/DL
HGB BLD-MCNC: 15.8 G/DL (ref 13–17.7)
HGB UR QL STRIP: NEGATIVE
KETONES UR QL STRIP: NEGATIVE
LDLC SERPL CALC-MCNC: 77 MG/DL (ref 0–99)
LEUKOCYTE ESTERASE UR QL STRIP: ABNORMAL
MCH RBC QN AUTO: 31.5 PG (ref 26.6–33)
MCHC RBC AUTO-ENTMCNC: 34.3 G/DL (ref 31.5–35.7)
MCV RBC AUTO: 92 FL (ref 79–97)
MICRO URNS: ABNORMAL
NITRITE UR QL STRIP: NEGATIVE
PH UR STRIP: 7 [PH] (ref 5–7.5)
PLATELET # BLD AUTO: 159 X10E3/UL (ref 150–450)
POTASSIUM SERPL-SCNC: 3.9 MMOL/L (ref 3.5–5.2)
PROT SERPL-MCNC: 7.9 G/DL (ref 6–8.5)
PROT UR QL STRIP: NEGATIVE
PSA SERPL-MCNC: 10.5 NG/ML (ref 0–4)
RBC # BLD AUTO: 5.01 X10E6/UL (ref 4.14–5.8)
RBC #/AREA URNS HPF: ABNORMAL /HPF (ref 0–2)
SODIUM SERPL-SCNC: 137 MMOL/L (ref 134–144)
SP GR UR STRIP: 1.01 (ref 1–1.03)
TRIGL SERPL-MCNC: 81 MG/DL (ref 0–149)
TSH SERPL DL<=0.005 MIU/L-ACNC: 2.46 UIU/ML (ref 0.45–4.5)
UROBILINOGEN UR STRIP-MCNC: 0.2 MG/DL (ref 0.2–1)
VLDLC SERPL CALC-MCNC: 15 MG/DL (ref 5–40)
WBC # BLD AUTO: 4.7 X10E3/UL (ref 3.4–10.8)
WBC #/AREA URNS HPF: ABNORMAL /HPF (ref 0–5)

## 2022-04-08 DIAGNOSIS — I10 ESSENTIAL HYPERTENSION, BENIGN: ICD-10-CM

## 2022-04-08 DIAGNOSIS — R35.1 BENIGN PROSTATIC HYPERPLASIA WITH NOCTURIA: ICD-10-CM

## 2022-04-08 DIAGNOSIS — N40.1 BENIGN PROSTATIC HYPERPLASIA WITH NOCTURIA: ICD-10-CM

## 2022-04-08 RX ORDER — HYDROCHLOROTHIAZIDE 25 MG/1
TABLET ORAL
Qty: 90 TABLET | Refills: 3 | Status: SHIPPED | OUTPATIENT
Start: 2022-04-08

## 2022-04-08 RX ORDER — TAMSULOSIN HYDROCHLORIDE 0.4 MG/1
CAPSULE ORAL
Qty: 180 CAPSULE | Refills: 3 | Status: SHIPPED | OUTPATIENT
Start: 2022-04-08

## 2022-04-08 NOTE — TELEPHONE ENCOUNTER
Last visit:4/4/22  Next visit:7/6/22  Previous refill 4/4/22(180+3R)    Requested Prescriptions     Pending Prescriptions Disp Refills    tamsulosin (FLOMAX) 0.4 mg capsule 180 Capsule 3     Sig: TAKE 2 CAPSULES BY MOUTH ONE TIME DAILY    hydroCHLOROthiazide (HYDRODIURIL) 25 mg tablet 90 Tablet 3     Sig: TAKE ONE TABLET BY MOUTH ONE TIME DAILY

## 2022-04-29 DIAGNOSIS — N40.1 BENIGN PROSTATIC HYPERPLASIA WITH NOCTURIA: Primary | ICD-10-CM

## 2022-04-29 DIAGNOSIS — R35.1 BENIGN PROSTATIC HYPERPLASIA WITH NOCTURIA: Primary | ICD-10-CM

## 2022-04-29 DIAGNOSIS — R97.20 ELEVATED PSA, BETWEEN 10 AND LESS THAN 20 NG/ML: ICD-10-CM

## 2022-07-21 ENCOUNTER — OFFICE VISIT (OUTPATIENT)
Dept: FAMILY MEDICINE CLINIC | Age: 67
End: 2022-07-21
Payer: MEDICARE

## 2022-07-21 VITALS
OXYGEN SATURATION: 98 % | WEIGHT: 223.2 LBS | HEIGHT: 71 IN | SYSTOLIC BLOOD PRESSURE: 136 MMHG | DIASTOLIC BLOOD PRESSURE: 79 MMHG | RESPIRATION RATE: 16 BRPM | BODY MASS INDEX: 31.25 KG/M2 | HEART RATE: 93 BPM | TEMPERATURE: 97.5 F

## 2022-07-21 DIAGNOSIS — M67.911 DISORDER OF ROTATOR CUFF, RIGHT: ICD-10-CM

## 2022-07-21 DIAGNOSIS — N52.9 ED (ERECTILE DYSFUNCTION) OF ORGANIC ORIGIN: ICD-10-CM

## 2022-07-21 DIAGNOSIS — M19.011 PRIMARY OSTEOARTHRITIS OF RIGHT SHOULDER: ICD-10-CM

## 2022-07-21 DIAGNOSIS — R35.1 BENIGN PROSTATIC HYPERPLASIA WITH NOCTURIA: Primary | ICD-10-CM

## 2022-07-21 DIAGNOSIS — N40.1 BENIGN PROSTATIC HYPERPLASIA WITH NOCTURIA: Primary | ICD-10-CM

## 2022-07-21 DIAGNOSIS — I10 ESSENTIAL HYPERTENSION, BENIGN: ICD-10-CM

## 2022-07-21 PROCEDURE — G8432 DEP SCR NOT DOC, RNG: HCPCS | Performed by: FAMILY MEDICINE

## 2022-07-21 PROCEDURE — 1101F PT FALLS ASSESS-DOCD LE1/YR: CPT | Performed by: FAMILY MEDICINE

## 2022-07-21 PROCEDURE — G8536 NO DOC ELDER MAL SCRN: HCPCS | Performed by: FAMILY MEDICINE

## 2022-07-21 PROCEDURE — 3017F COLORECTAL CA SCREEN DOC REV: CPT | Performed by: FAMILY MEDICINE

## 2022-07-21 PROCEDURE — G8417 CALC BMI ABV UP PARAM F/U: HCPCS | Performed by: FAMILY MEDICINE

## 2022-07-21 PROCEDURE — G8752 SYS BP LESS 140: HCPCS | Performed by: FAMILY MEDICINE

## 2022-07-21 PROCEDURE — G8754 DIAS BP LESS 90: HCPCS | Performed by: FAMILY MEDICINE

## 2022-07-21 PROCEDURE — 99213 OFFICE O/P EST LOW 20 MIN: CPT | Performed by: FAMILY MEDICINE

## 2022-07-21 PROCEDURE — G8427 DOCREV CUR MEDS BY ELIG CLIN: HCPCS | Performed by: FAMILY MEDICINE

## 2022-07-21 PROCEDURE — 1123F ACP DISCUSS/DSCN MKR DOCD: CPT | Performed by: FAMILY MEDICINE

## 2022-07-21 RX ORDER — SILDENAFIL 50 MG/1
50 TABLET, FILM COATED ORAL
Qty: 30 TABLET | Refills: 1 | Status: SHIPPED | OUTPATIENT
Start: 2022-07-21

## 2022-07-21 RX ORDER — MELOXICAM 15 MG/1
15 TABLET ORAL
Qty: 30 TABLET | Refills: 0 | Status: SHIPPED | OUTPATIENT
Start: 2022-07-21 | End: 2022-08-18

## 2022-07-21 NOTE — PROGRESS NOTES
Yuli Mario (: 1955) is a 74 y.o. male, established patient, here for evaluation of the following chief complaint(s):  Hypertension (Follow-up)       ASSESSMENT/PLAN: stable overall. Ct seeing Uro for BPH with elevated PSA, HTN controlled. Mild R shoulder OA and RC dysfunction sx and declines interventions at this time. Below is the assessment and plan developed based on review of pertinent history, physical exam, labs, studies, and medications. 1. Benign prostatic hyperplasia with nocturia  2. Essential hypertension, benign  3. Primary osteoarthritis of right shoulder  -     meloxicam (MOBIC) 15 mg tablet; Take 1 Tablet by mouth daily (after breakfast). Take x 1 week and then stop. May use daily after this as needed., Normal, Disp-30 Tablet, R-0  4. Disorder of rotator cuff, right  -     meloxicam (MOBIC) 15 mg tablet; Take 1 Tablet by mouth daily (after breakfast). Take x 1 week and then stop. May use daily after this as needed., Normal, Disp-30 Tablet, R-0  5. ED (erectile dysfunction) of organic origin  -     sildenafil citrate (VIAGRA) 50 mg tablet; Take 1 Tablet by mouth daily as needed for Erectile Dysfunction. , Print, Disp-30 Tablet, R-1    Return in about 6 months (around 2023). Subjective:     Elevated PSA to > 10 - Sent to Dr. Victoria Whitehead. PSA was better there and ct Flomax 0.8 mg daily. Getting MRI to eval further. R shoulder pain - ongoing issue for years. Flaring with overhead activity and worse at night. Feels tolerable and not interested in steroid injection. Declines PT. Had colonoscopy with Dr. Cheri Cavazos in  but no records available.     ROS  Gen - no fever/chills  Resp - no dyspnea or cough  CV - no chest pain or RODGERS  Rest per HPI    Past Medical History:   Diagnosis Date    FH: hypertension     Hypertension      Past Surgical History:   Procedure Laterality Date    HX ORTHOPAEDIC      ganglion cyst of left wrist    [place] COLONOSCOPY FLX DX W/COLLJ SPEC WHEN PFRMD 8/29/2011             Objective:     Blood pressure 136/79, pulse 93, temperature 97.5 °F (36.4 °C), temperature source Temporal, resp. rate 16, height 5' 11\" (1.803 m), weight 223 lb 3.2 oz (101.2 kg), SpO2 98 %. Physical Exam  General appearance - alert, well appearing, and in no distress  Eyes -sclera anicteric  Neck - supple, no significant adenopathy, no thyromegaly  Chest - clear to auscultation, no wheezes, rales or rhonchi, symmetric air entry  Heart - normal rate, regular rhythm, normal S1, S2, no murmurs, rubs, clicks or gallops  Neurological - alert, oriented, normal speech, no focal findings or movement disorder noted  Extr - no edema  Psych - normal mood and affect    On this date 07/21/2022 I have spent 20 minutes reviewing previous notes, test results and face to face with the patient discussing the diagnosis and importance of compliance with the treatment plan as well as documenting on the day of the visit. An electronic signature was used to authenticate this note.   -- Nawaf Schumacher MD

## 2022-07-21 NOTE — PROGRESS NOTES
Chief Complaint   Patient presents with    Hypertension     Follow-up    1. Have you been to the ER, urgent care clinic since your last visit? Hospitalized since your last visit? No    2. Have you seen or consulted any other health care providers outside of the [address] [place] [address] since your last visit? Include any pap smears or colon screening.  No

## 2022-08-18 DIAGNOSIS — M67.911 DISORDER OF ROTATOR CUFF, RIGHT: ICD-10-CM

## 2022-08-18 DIAGNOSIS — M19.011 PRIMARY OSTEOARTHRITIS OF RIGHT SHOULDER: ICD-10-CM

## 2022-08-18 RX ORDER — MELOXICAM 15 MG/1
TABLET ORAL
Qty: 30 TABLET | Refills: 0 | Status: SHIPPED | OUTPATIENT
Start: 2022-08-18

## 2023-01-24 ENCOUNTER — OFFICE VISIT (OUTPATIENT)
Dept: FAMILY MEDICINE CLINIC | Age: 68
End: 2023-01-24

## 2023-01-24 VITALS
WEIGHT: 223.6 LBS | RESPIRATION RATE: 16 BRPM | HEIGHT: 71 IN | BODY MASS INDEX: 31.3 KG/M2 | TEMPERATURE: 98.4 F | SYSTOLIC BLOOD PRESSURE: 137 MMHG | HEART RATE: 99 BPM | DIASTOLIC BLOOD PRESSURE: 91 MMHG | OXYGEN SATURATION: 96 %

## 2023-01-24 DIAGNOSIS — Z79.899 ENCOUNTER FOR LONG-TERM CURRENT USE OF MEDICATION: ICD-10-CM

## 2023-01-24 DIAGNOSIS — R35.1 BENIGN PROSTATIC HYPERPLASIA WITH NOCTURIA: ICD-10-CM

## 2023-01-24 DIAGNOSIS — I10 ESSENTIAL HYPERTENSION, BENIGN: Primary | ICD-10-CM

## 2023-01-24 DIAGNOSIS — R73.03 PREDIABETES: ICD-10-CM

## 2023-01-24 DIAGNOSIS — M19.011 PRIMARY OSTEOARTHRITIS OF RIGHT SHOULDER: ICD-10-CM

## 2023-01-24 DIAGNOSIS — E66.01 SEVERE OBESITY (HCC): ICD-10-CM

## 2023-01-24 DIAGNOSIS — N52.9 ED (ERECTILE DYSFUNCTION) OF ORGANIC ORIGIN: ICD-10-CM

## 2023-01-24 DIAGNOSIS — Z23 ENCOUNTER FOR IMMUNIZATION: ICD-10-CM

## 2023-01-24 DIAGNOSIS — Z12.11 COLON CANCER SCREENING: ICD-10-CM

## 2023-01-24 DIAGNOSIS — N40.1 BENIGN PROSTATIC HYPERPLASIA WITH NOCTURIA: ICD-10-CM

## 2023-01-24 RX ORDER — SILDENAFIL 50 MG/1
50 TABLET, FILM COATED ORAL
Qty: 30 TABLET | Refills: 1 | Status: SHIPPED | OUTPATIENT
Start: 2023-01-24 | End: 2023-01-24 | Stop reason: SDUPTHER

## 2023-01-24 RX ORDER — SILDENAFIL 50 MG/1
50 TABLET, FILM COATED ORAL
Qty: 30 TABLET | Refills: 1 | Status: SHIPPED | OUTPATIENT
Start: 2023-01-24

## 2023-01-24 NOTE — PROGRESS NOTES
Chief Complaint   Patient presents with    Hypertension     Follow-up    1. Have you been to the ER, urgent care clinic since your last visit? Hospitalized since your last visit? No    2. Have you seen or consulted any other health care providers outside of the [address] [place] [address] since your last visit? Include any pap smears or colon screening. No     He denies any symptoms , reactions or allergies that would exclude them from being immunized today. Risks and adverse reactions were discussed and the VIS was given to them. All questions were addressed. He was observed for 15 min post injection. There were no reactions observed.     Ofelia Chavez LPN    Wife has breast cancer      Mom has been sick

## 2023-01-24 NOTE — PROGRESS NOTES
Redd Santana (: 1955) is a 74 y.o. male, established patient, here for evaluation of the following chief complaint(s):  Hypertension (Follow-up)       ASSESSMENT/PLAN:  Diagnoses and all orders for this visit:    1. Essential hypertension, benign- bp running slightly high today but improved on repeat  -     METABOLIC PANEL, COMPREHENSIVE; Future    2. Benign prostatic hyperplasia with nocturia- controlled  -     URINALYSIS W/ RFLX MICROSCOPIC; Future    3. Primary osteoarthritis of right shoulder - stable issue    4. Prediabetes- stable, working on diet and exercise  -     HEMOGLOBIN A1C WITH EAG; Future    5. Severe obesity ([facility] [address]- stable, working on weight loss    6. ED (erectile dysfunction) of organic origin - doing well with Viagra prn  -     sildenafil citrate (VIAGRA) 50 mg tablet; Take 1 Tablet by mouth daily as needed for Erectile Dysfunction. 7. Encounter for immunization  -     COVID-19, [address], (AGE 12Y+), IM, 30 MCG/0.3 ML    8. Encounter for long-term current use of medication  -     HEMOGLOBIN A1C WITH EAG; Future  -     LIPID PANEL; Future  -     METABOLIC PANEL, COMPREHENSIVE; Future  -     TSH 3RD GENERATION; Future  -     CBC W/O DIFF; Future  -     URINALYSIS W/ RFLX MICROSCOPIC; Future    Return in about 3 months (around 2023), or if symptoms worsen or fail to improve, for blood pressure check. Subjective:   Pt is a 74 y.o. male with PMH sig for HTN, hx of sciatica, lumbar spinal stenosis, obesity here for routine follow up on chronic medical conditions    Hyperlipidemia & HTN  Pt is doing well on current meds with no medication side effects noted  No new myalgias, no joint pains, no weakness  No TIA's, no chest pain on exertion, no dyspnea on exertion, no swelling of ankles.   Exercising - Minimal  Dieting - Yes  Smoking - No     Lab Results   Component Value Date/Time    Cholesterol, total 162 2022 01:48 PM    HDL Cholesterol 70 2022 01:48 PM LDL, calculated 77 04/04/2022 01:48 PM    LDL, calculated 73 02/24/2020 12:31 PM    Triglyceride 81 04/04/2022 01:48 PM     ROS  Gen - no fever/chills  Resp - no dyspnea or cough  CV - no chest pain or RODGERS   - + BPH, working with Dr. Jose Little d/t elevated PSA. Kristen Flomax 0.8 mg. On HCTZ during day and stable. Rest per HPI    Past Medical History:   Diagnosis Date    FH: hypertension     Hypertension      Past Surgical History:   Procedure Laterality Date    HX ORTHOPAEDIC      ganglion cyst of left wrist    [place] COLONOSCOPY FLX DX W/COLLJ SPEC WHEN PFRMD  8/29/2011             Objective:     Blood pressure (!) 137/91, pulse 99, temperature 98.4 °F (36.9 °C), temperature source Oral, resp. rate 16, height 5' 11\" (1.803 m), weight 223 lb 9.6 oz (101.4 kg), SpO2 96 %. Physical Exam  General appearance - alert, well appearing, and in no distress  Eyes -sclera anicteric  Neck - supple, no significant adenopathy, no thyromegaly  Chest - clear to auscultation, no wheezes, rales or rhonchi, symmetric air entry  Heart - normal rate, regular rhythm, normal S1, S2, no murmurs, rubs, clicks or gallops  Neurological - alert, oriented, normal speech, no focal findings or movement disorder noted  Extr - no edema  Psych - normal mood and affect      An electronic signature was used to authenticate this note.   -- Mercedes Torres MD

## 2023-04-26 ENCOUNTER — OFFICE VISIT (OUTPATIENT)
Dept: FAMILY MEDICINE CLINIC | Age: 68
End: 2023-04-26

## 2023-04-26 VITALS
RESPIRATION RATE: 20 BRPM | DIASTOLIC BLOOD PRESSURE: 86 MMHG | OXYGEN SATURATION: 95 % | HEIGHT: 71 IN | WEIGHT: 225.8 LBS | BODY MASS INDEX: 31.61 KG/M2 | TEMPERATURE: 97.5 F | SYSTOLIC BLOOD PRESSURE: 121 MMHG | HEART RATE: 70 BPM

## 2023-04-26 DIAGNOSIS — R35.1 BENIGN PROSTATIC HYPERPLASIA WITH NOCTURIA: ICD-10-CM

## 2023-04-26 DIAGNOSIS — Z00.00 INITIAL MEDICARE ANNUAL WELLNESS VISIT: Primary | ICD-10-CM

## 2023-04-26 DIAGNOSIS — E66.01 SEVERE OBESITY (HCC): ICD-10-CM

## 2023-04-26 DIAGNOSIS — N40.1 BENIGN PROSTATIC HYPERPLASIA WITH NOCTURIA: ICD-10-CM

## 2023-04-26 DIAGNOSIS — R73.03 PREDIABETES: ICD-10-CM

## 2023-04-26 DIAGNOSIS — M19.011 PRIMARY OSTEOARTHRITIS OF SHOULDERS, BILATERAL: ICD-10-CM

## 2023-04-26 DIAGNOSIS — I10 ESSENTIAL HYPERTENSION, BENIGN: ICD-10-CM

## 2023-04-26 DIAGNOSIS — M19.012 PRIMARY OSTEOARTHRITIS OF SHOULDERS, BILATERAL: ICD-10-CM

## 2023-04-26 NOTE — PROGRESS NOTES
Chastity Varela (: 1955) is a 79 y.o. male, established patient, here for evaluation of the following chief complaint(s):  Hypertension (Three month follow-up), Shoulder Pain (Both shoulders, Pain for the past year), and Annual Wellness Visit         ASSESSMENT/PLAN:  Diagnoses and all orders for this visit:    1. Initial Medicare annual wellness visit    2. Essential hypertension, benign - controlled    3. Prediabetes - controlled    4. Benign prostatic hyperplasia with nocturia - stable, following with Uro    5. Severe obesity ([address] - work on diet and exercise    6. Primary osteoarthritis of bilat shoulder - stable issue, encouraged APAP prn    Return in about 6 months (around 10/26/2023), or if symptoms worsen or fail to improve. Subjective:   Pt is a 79 y.o. male with PMH sig for HTN, hx of sciatica, lumbar spinal stenosis, obesity here for routine follow up on chronic medical conditions    Working 3 days per week. Hyperlipidemia & HTN  Pt is doing well on current meds with no medication side effects noted  No new myalgias, no joint pains, no weakness  No TIA's, no chest pain on exertion, no dyspnea on exertion, no swelling of ankles. Exercising - staying   Dieting - Yes  Smoking - No     Lab Results   Component Value Date/Time    Cholesterol, total 161 2023 02:27 PM    HDL Cholesterol 75 2023 02:27 PM    LDL, calculated 71.8 2023 02:27 PM    Triglyceride 71 2023 02:27 PM     Bilateral shoulders with ongoing pain. X-rays from 2016 with mild-mod OA bilat. Feels like this is moderate and using APAP. Does not limit him and does stay very active. ROS  Gen - no fever/chills  Resp - no dyspnea or cough  CV - no chest pain or RODGERS   - + BPH, working with Dr. Briseida Zelaya d/t elevated PSA. Kristen Flomax 0.8 mg. On HCTZ during day and stable.   Rest per HPI    Past Medical History:   Diagnosis Date    FH: hypertension     Hypertension      Past Surgical History:   Procedure Laterality Date    HX ORTHOPAEDIC      ganglion cyst of left wrist    [facility] COLONOSCOPY FLX DX W/COLLJ SPEC WHEN PFRMD  8/29/2011             Objective:     Blood pressure 121/86, pulse 70, temperature 97.5 °F (36.4 °C), temperature source Temporal, resp. rate 20, height 5' 11\" (1.803 m), weight 225 lb 12.8 oz (102.4 kg), SpO2 95 %. Physical Exam  General appearance - alert, well appearing, and in no distress  Eyes -sclera anicteric  Neck - supple, no significant adenopathy, no thyromegaly  Chest - clear to auscultation, no wheezes, rales or rhonchi, symmetric air entry  Heart - normal rate, regular rhythm, normal S1, S2, no murmurs, rubs, clicks or gallops  Neurological - alert, oriented, normal speech, no focal findings or movement disorder noted  Extr - no edema  Psych - normal mood and affect      An electronic signature was used to authenticate this note. -- Joanie Blanco MD      This is the Subsequent Medicare Annual Wellness Exam, performed 12 months or more after the Initial AWV or the last Subsequent AWV    I have reviewed the patient's medical history in detail and updated the computerized patient record. Assessment/Plan   Education and counseling provided:  Are appropriate based on today's review and evaluation    1. Initial Medicare annual wellness visit  2. Essential hypertension, benign  3. Prediabetes  4. Benign prostatic hyperplasia with nocturia  5. Severe obesity ([facility] [address]  6.  Primary osteoarthritis of shoulders, bilateral       Depression Risk Factor Screening     3 most recent PHQ Screens 4/26/2023   Little interest or pleasure in doing things Not at all   Feeling down, depressed, irritable, or hopeless Not at all   Total Score PHQ 2 0   Trouble falling or staying asleep, or sleeping too much -   Feeling tired or having little energy -   Poor appetite, weight loss, or overeating -   Feeling bad about yourself - or that you are a failure or have let yourself or your family down -   Trouble concentrating on things such as school, work, reading, or watching TV -   Moving or speaking so slowly that other people could have noticed; or the opposite being so fidgety that others notice -   Thoughts of being better off dead, or hurting yourself in some way -   PHQ 9 Score -   How difficult have these problems made it for you to do your work, take care of your home and get along with others -       Alcohol & Drug Abuse Risk Screen    Do you average more than 1 drink per night or more than 7 drinks a week: No    In the past three months have you have had more than 4 drinks containing alcohol on one occasion: No          Functional Ability and Level of Safety    Hearing: Hearing is good. Activities of Daily Living: The home contains: no safety equipment. Patient does total self care      Ambulation: with no difficulty     Fall Risk:  Fall Risk Assessment, last 12 mths 4/26/2023   Able to walk? Yes   Fall in past 12 months? 0   Do you feel unsteady?  0   Are you worried about falling 0      Abuse Screen:  Patient is not abused       Cognitive Screening    Has your family/caregiver stated any concerns about your memory: no     Cognitive Screening: Normal - Verbal Fluency Test    Health Maintenance Due     Health Maintenance Due   Topic Date Due    Colorectal Cancer Screening Combo  08/29/2021    Pneumococcal 65+ years (1 - PCV) Never done       Patient Care Team   Patient Care Team:  Telma Reid MD as PCP - General (Family Medicine)  Telma Reid MD as PCP - [facility]  Luis Sparks MD (Ophthalmology)  Fransisco Mccormick MD (Orthopedic Surgery)  Micheline Hairston MD (Urology)    History     Patient Active Problem List   Diagnosis Code    FH: hypertension Z82.49    ED (erectile dysfunction) of organic origin N52.9    Essential hypertension, benign I10    Encounter for long-term (current) use of medications Z79.899    Allergic rhinitis J30.9    Left sided sciatica M54.32 Glaucoma suspect of both eyes H40.003    Combined forms of age-related cataract H25.819    Spinal stenosis of lumbar region without neurogenic claudication M48.061    Severe obesity (HCC) E66.01     Past Medical History:   Diagnosis Date    FH: hypertension     Hypertension       Past Surgical History:   Procedure Laterality Date    HX ORTHOPAEDIC      ganglion cyst of left wrist    [facility] COLONOSCOPY FLX DX W/COLLJ SPEC WHEN PFRMD  8/29/2011          Current Outpatient Medications   Medication Sig Dispense Refill    sildenafil citrate (VIAGRA) 50 mg tablet Take 1 Tablet by mouth daily as needed for Erectile Dysfunction. 30 Tablet 1    tamsulosin (FLOMAX) 0.4 mg capsule TAKE 2 CAPSULES BY MOUTH ONE TIME DAILY 180 Capsule 3    hydroCHLOROthiazide (HYDRODIURIL) 25 mg tablet TAKE ONE TABLET BY MOUTH ONE TIME DAILY 90 Tablet 3    diclofenac (VOLTAREN) 1 % gel APPLY 2 GRAMS TO AFFECTED AREA FOUR (4) TIMES DAILY. 100 g 1    latanoprost (XALATAN) 0.005 % ophthalmic solution INSTIL 1 DROP INTO BOTH EYES NIGHTLY AT BEDTIME  6    multivitamin (ONE A DAY) tablet Take 1 Tablet by mouth daily as needed. No Known Allergies    Family History   Problem Relation Age of Onset    Hypertension Mother     Cancer Father         ? ? Hypertension Brother      Social History     Tobacco Use    Smoking status: Never    Smokeless tobacco: Never   Substance Use Topics    Alcohol use: Yes     Alcohol/week: 4.2 standard drinks     Types: 3 Cans of beer, 2 Standard drinks or equivalent per week         Yamini Pena MD   This is an Initial Medicare Annual Wellness Exam (AWV) (Performed 12 months after IPPE or effective date of Medicare Part B enrollment, Once in a lifetime)    I have reviewed the patient's medical history in detail and updated the computerized patient record. Assessment/Plan   Education and counseling provided:  Are appropriate based on today's review and evaluation    1.  Initial Medicare annual wellness visit  2. Essential hypertension, benign  3. Prediabetes  4. Benign prostatic hyperplasia with nocturia  5. Severe obesity ([address]  6. Primary osteoarthritis of shoulders, bilateral       Depression Risk Factor Screening     3 most recent PHQ Screens 4/26/2023   Little interest or pleasure in doing things Not at all   Feeling down, depressed, irritable, or hopeless Not at all   Total Score PHQ 2 0   Trouble falling or staying asleep, or sleeping too much -   Feeling tired or having little energy -   Poor appetite, weight loss, or overeating -   Feeling bad about yourself - or that you are a failure or have let yourself or your family down -   Trouble concentrating on things such as school, work, reading, or watching TV -   Moving or speaking so slowly that other people could have noticed; or the opposite being so fidgety that others notice -   Thoughts of being better off dead, or hurting yourself in some way -   PHQ 9 Score -   How difficult have these problems made it for you to do your work, take care of your home and get along with others -       Alcohol & Drug Abuse Risk Screen    Do you average more than 1 drink per night or more than 7 drinks a week: No    In the past three months have you have had more than 4 drinks containing alcohol on one occasion: No          Functional Ability and Level of Safety    Hearing: Hearing is good. Activities of Daily Living: The home contains: no safety equipment. Patient does total self care     Ambulation: with no difficulty      Fall Risk:  Fall Risk Assessment, last 12 mths 4/26/2023   Able to walk? Yes   Fall in past 12 months? 0   Do you feel unsteady?  0   Are you worried about falling 0      Abuse Screen:  Patient is not abused       Cognitive Screening    Has your family/caregiver stated any concerns about your memory: no     Cognitive Screening: Normal - Verbal Fluency Test    Health Maintenance Due     Health Maintenance Due   Topic Date Due    Colorectal Cancer Screening Combo  08/29/2021    Pneumococcal 65+ years (1 - PCV) Never done       Patient Care Team   Patient Care Team:  Dionte Jean MD as PCP - General (Family Medicine)  Dionte Jean MD as PCP - [facility]  Loreto Chowdary MD (Ophthalmology)  Renetta Mares MD (Orthopedic Surgery)  Peterson Potts MD (Urology)    History     Patient Active Problem List   Diagnosis Code    FH: hypertension Z82.49    ED (erectile dysfunction) of organic origin N52.9    Essential hypertension, benign I10    Encounter for long-term (current) use of medications Z79.899    Allergic rhinitis J30.9    Left sided sciatica M54.32    Glaucoma suspect of both eyes H40.003    Combined forms of age-related cataract H25.819    Spinal stenosis of lumbar region without neurogenic claudication M48.061    Severe obesity ([address] E66.01     Past Medical History:   Diagnosis Date    FH: hypertension     Hypertension       Past Surgical History:   Procedure Laterality Date    HX ORTHOPAEDIC      ganglion cyst of left wrist    [facility] COLONOSCOPY FLX DX W/COLLJ SPEC WHEN PFRMD  8/29/2011          Current Outpatient Medications   Medication Sig Dispense Refill    sildenafil citrate (VIAGRA) 50 mg tablet Take 1 Tablet by mouth daily as needed for Erectile Dysfunction. 30 Tablet 1    tamsulosin (FLOMAX) 0.4 mg capsule TAKE 2 CAPSULES BY MOUTH ONE TIME DAILY 180 Capsule 3    hydroCHLOROthiazide (HYDRODIURIL) 25 mg tablet TAKE ONE TABLET BY MOUTH ONE TIME DAILY 90 Tablet 3    diclofenac (VOLTAREN) 1 % gel APPLY 2 GRAMS TO AFFECTED AREA FOUR (4) TIMES DAILY. 100 g 1    latanoprost (XALATAN) 0.005 % ophthalmic solution INSTIL 1 DROP INTO BOTH EYES NIGHTLY AT BEDTIME  6    multivitamin (ONE A DAY) tablet Take 1 Tablet by mouth daily as needed. No Known Allergies    Family History   Problem Relation Age of Onset    Hypertension Mother     Cancer Father         ? ?     Hypertension Brother      Social History     Tobacco Use Smoking status: Never    Smokeless tobacco: Never   Substance Use Topics    Alcohol use:  Yes     Alcohol/week: 4.2 standard drinks     Types: 3 Cans of beer, 2 Standard drinks or equivalent per week       Brigette Singleton MD

## 2023-04-26 NOTE — PROGRESS NOTES
Chief Complaint   Patient presents with    Hypertension     Three month follow-up     1. Have you been to the ER, urgent care clinic since your last visit? Hospitalized since your last visit? No    2. Have you seen or consulted any other health care providers outside of the [address] [place] [address] since your last visit? Include any pap smears or colon screening.  No

## 2023-05-08 NOTE — TELEPHONE ENCOUNTER
Last appointment: 4/26/23 with MD Danny Ta  Next appointment: 10/25/23 with MD Danny Ta  Previous refill encounter(s): 4/8/22 90 d/s with 3 refills    Requested Prescriptions     Pending Prescriptions Disp Refills    tamsulosin (FLOMAX) 0.4 MG capsule [Pharmacy Med Name: TAMSULOSIN HYDROCHLORIDE 0.4 MG Capsule] 180 capsule 3     Sig: TAKE 2 CAPSULES EVERY DAY    hydroCHLOROthiazide (HYDRODIURIL) 25 MG tablet [Pharmacy Med Name: HYDROCHLOROTHIAZIDE 25 MG Tablet] 90 tablet 3     Sig: TAKE 1 TABLET EVERY DAY

## 2023-05-09 RX ORDER — HYDROCHLOROTHIAZIDE 25 MG/1
TABLET ORAL
Qty: 90 TABLET | Refills: 3 | Status: SHIPPED | OUTPATIENT
Start: 2023-05-09

## 2023-05-09 RX ORDER — TAMSULOSIN HYDROCHLORIDE 0.4 MG/1
CAPSULE ORAL
Qty: 180 CAPSULE | Refills: 3 | Status: SHIPPED | OUTPATIENT
Start: 2023-05-09

## 2023-09-08 RX ORDER — SILDENAFIL 50 MG/1
TABLET, FILM COATED ORAL
Qty: 30 TABLET | Refills: 1 | Status: SHIPPED | OUTPATIENT
Start: 2023-09-08

## 2023-09-08 NOTE — TELEPHONE ENCOUNTER
Last appointment: 4/26/23  Next appointment: Daisy Stark to follow-up 10/26/23  Previous refill encounter(s): 1/24/23 #30 with 1 refill    Requested Prescriptions     Pending Prescriptions Disp Refills    sildenafil (VIAGRA) 50 MG tablet [Pharmacy Med Name: SILDENAFIL 50 MG TABLET] 30 tablet 1     Sig: TAKE ONE TABLET BY MOUTH DAILY FOR ERECTILE DYSFUNCTION         For Pharmacy Admin Tracking Only    Program: Medication Refill  CPA in place:    Recommendation Provided To:    Intervention Detail: New Rx: 1, reason: Patient Preference  Intervention Accepted By:   Nicole Kline Closed?:    Time Spent (min): 5